# Patient Record
Sex: FEMALE | Race: WHITE | Employment: UNEMPLOYED | URBAN - NONMETROPOLITAN AREA
[De-identification: names, ages, dates, MRNs, and addresses within clinical notes are randomized per-mention and may not be internally consistent; named-entity substitution may affect disease eponyms.]

---

## 2018-06-27 ENCOUNTER — HOSPITAL ENCOUNTER (OUTPATIENT)
Dept: ULTRASOUND IMAGING | Age: 53
Discharge: HOME | End: 2018-06-27
Payer: COMMERCIAL

## 2018-06-27 DIAGNOSIS — E03.9 HYPOTHYROIDISM, UNSPECIFIED TYPE: ICD-10-CM

## 2018-06-27 PROCEDURE — 76536 US EXAM OF HEAD AND NECK: CPT

## 2019-05-16 ENCOUNTER — HOSPITAL ENCOUNTER (OUTPATIENT)
Dept: CT IMAGING | Age: 54
Discharge: HOME | End: 2019-05-16
Payer: COMMERCIAL

## 2019-05-16 DIAGNOSIS — R10.84 DIFFUSE ABDOMINAL PAIN: ICD-10-CM

## 2019-05-16 PROCEDURE — 74177 CT ABD & PELVIS W/CONTRAST: CPT

## 2019-05-16 PROCEDURE — 6360000004 HC RX CONTRAST MEDICATION: Performed by: NURSE PRACTITIONER

## 2019-05-16 RX ADMIN — IOHEXOL 50 ML: 240 INJECTION, SOLUTION INTRATHECAL; INTRAVASCULAR; INTRAVENOUS; ORAL at 11:07

## 2019-05-16 RX ADMIN — IOPAMIDOL 100 ML: 755 INJECTION, SOLUTION INTRAVENOUS at 11:07

## 2019-08-29 ENCOUNTER — NURSE ONLY (OUTPATIENT)
Dept: LAB | Age: 54
End: 2019-08-29

## 2021-02-08 ENCOUNTER — HOSPITAL ENCOUNTER (OUTPATIENT)
Dept: ULTRASOUND IMAGING | Age: 56
Discharge: HOME | End: 2021-02-08
Payer: COMMERCIAL

## 2021-02-08 DIAGNOSIS — E04.9 ENLARGEMENT OF THYROID: ICD-10-CM

## 2021-02-08 PROCEDURE — 76536 US EXAM OF HEAD AND NECK: CPT

## 2021-08-13 ENCOUNTER — HOSPITAL ENCOUNTER (OUTPATIENT)
Dept: ULTRASOUND IMAGING | Age: 56
Discharge: HOME | End: 2021-08-13
Payer: COMMERCIAL

## 2021-08-13 ENCOUNTER — HOSPITAL ENCOUNTER (OUTPATIENT)
Age: 56
End: 2021-08-13

## 2021-08-13 DIAGNOSIS — E04.1 THYROID NODULE: ICD-10-CM

## 2021-08-13 PROCEDURE — 76536 US EXAM OF HEAD AND NECK: CPT

## 2022-02-28 ENCOUNTER — NURSE ONLY (OUTPATIENT)
Dept: LAB | Age: 57
End: 2022-02-28

## 2022-03-07 LAB — CYTOLOGY THIN PREP PAP: NORMAL

## 2022-05-11 LAB — MAMMOGRAPHY, EXTERNAL: NORMAL

## 2022-09-13 LAB
ALBUMIN SERPL-MCNC: 4.4 G/DL
ALP BLD-CCNC: 43 U/L
ALT SERPL-CCNC: 18 U/L
ANION GAP SERPL CALCULATED.3IONS-SCNC: 10 MMOL/L
AST SERPL-CCNC: 17 U/L
AVERAGE GLUCOSE: 103
BILIRUB SERPL-MCNC: 0.5 MG/DL (ref 0.1–1.4)
BUN BLDV-MCNC: 17 MG/DL
CALCIUM SERPL-MCNC: 9.4 MG/DL
CHLORIDE BLD-SCNC: 106 MMOL/L
CHOLESTEROL, TOTAL: 168 MG/DL
CHOLESTEROL/HDL RATIO: 4
CO2: 28 MMOL/L
CREAT SERPL-MCNC: 1 MG/DL
GFR CALCULATED: >60
GLUCOSE BLD-MCNC: 94 MG/DL
HBA1C MFR BLD: 5.2 %
HDLC SERPL-MCNC: 40 MG/DL (ref 35–70)
LDL CHOLESTEROL CALCULATED: 102 MG/DL (ref 0–160)
NONHDLC SERPL-MCNC: NORMAL MG/DL
POTASSIUM SERPL-SCNC: 4.1 MMOL/L
SODIUM BLD-SCNC: 140 MMOL/L
TOTAL PROTEIN: 7
TRIGL SERPL-MCNC: 130 MG/DL
VLDLC SERPL CALC-MCNC: 26 MG/DL

## 2022-09-19 ENCOUNTER — TRANSCRIBE ORDERS (OUTPATIENT)
Dept: ADMINISTRATIVE | Age: 57
End: 2022-09-19

## 2022-09-19 DIAGNOSIS — E04.1 THYROID NODULE: Primary | ICD-10-CM

## 2022-10-12 ENCOUNTER — HOSPITAL ENCOUNTER (OUTPATIENT)
Dept: ULTRASOUND IMAGING | Age: 57
Discharge: HOME | End: 2022-10-12
Payer: COMMERCIAL

## 2022-10-12 DIAGNOSIS — E04.1 THYROID NODULE: ICD-10-CM

## 2022-10-12 PROCEDURE — 76536 US EXAM OF HEAD AND NECK: CPT

## 2022-11-30 SDOH — HEALTH STABILITY: PHYSICAL HEALTH: ON AVERAGE, HOW MANY DAYS PER WEEK DO YOU ENGAGE IN MODERATE TO STRENUOUS EXERCISE (LIKE A BRISK WALK)?: 3 DAYS

## 2022-11-30 SDOH — HEALTH STABILITY: PHYSICAL HEALTH: ON AVERAGE, HOW MANY MINUTES DO YOU ENGAGE IN EXERCISE AT THIS LEVEL?: 30 MIN

## 2022-12-01 ENCOUNTER — OFFICE VISIT (OUTPATIENT)
Dept: FAMILY MEDICINE CLINIC | Age: 57
End: 2022-12-01
Payer: COMMERCIAL

## 2022-12-01 VITALS
TEMPERATURE: 97.4 F | DIASTOLIC BLOOD PRESSURE: 70 MMHG | WEIGHT: 197.8 LBS | SYSTOLIC BLOOD PRESSURE: 126 MMHG | HEART RATE: 86 BPM | RESPIRATION RATE: 16 BRPM | BODY MASS INDEX: 29.3 KG/M2 | HEIGHT: 69 IN | OXYGEN SATURATION: 97 %

## 2022-12-01 DIAGNOSIS — R13.10 DYSPHAGIA, UNSPECIFIED TYPE: ICD-10-CM

## 2022-12-01 DIAGNOSIS — E78.2 MIXED HYPERLIPIDEMIA: Primary | ICD-10-CM

## 2022-12-01 DIAGNOSIS — Z99.89 OBSTRUCTIVE SLEEP APNEA ON CPAP: ICD-10-CM

## 2022-12-01 DIAGNOSIS — R06.02 SHORTNESS OF BREATH: ICD-10-CM

## 2022-12-01 DIAGNOSIS — E04.9 GOITER: ICD-10-CM

## 2022-12-01 DIAGNOSIS — G47.33 OBSTRUCTIVE SLEEP APNEA ON CPAP: ICD-10-CM

## 2022-12-01 PROCEDURE — G8419 CALC BMI OUT NRM PARAM NOF/U: HCPCS | Performed by: FAMILY MEDICINE

## 2022-12-01 PROCEDURE — 1036F TOBACCO NON-USER: CPT | Performed by: FAMILY MEDICINE

## 2022-12-01 PROCEDURE — G8482 FLU IMMUNIZE ORDER/ADMIN: HCPCS | Performed by: FAMILY MEDICINE

## 2022-12-01 PROCEDURE — G8427 DOCREV CUR MEDS BY ELIG CLIN: HCPCS | Performed by: FAMILY MEDICINE

## 2022-12-01 PROCEDURE — 3017F COLORECTAL CA SCREEN DOC REV: CPT | Performed by: FAMILY MEDICINE

## 2022-12-01 PROCEDURE — 99204 OFFICE O/P NEW MOD 45 MIN: CPT | Performed by: FAMILY MEDICINE

## 2022-12-01 RX ORDER — AMITRIPTYLINE HYDROCHLORIDE 10 MG/1
TABLET, FILM COATED ORAL
COMMUNITY
Start: 2022-11-22

## 2022-12-01 RX ORDER — VALACYCLOVIR HYDROCHLORIDE 1 G/1
TABLET, FILM COATED ORAL
COMMUNITY
Start: 2022-10-18

## 2022-12-01 RX ORDER — HYDROCHLOROTHIAZIDE 12.5 MG/1
TABLET ORAL PRN
COMMUNITY
Start: 2022-10-18

## 2022-12-01 RX ORDER — BUPROPION HYDROCHLORIDE 300 MG/1
TABLET ORAL
COMMUNITY
Start: 2022-10-18

## 2022-12-01 SDOH — ECONOMIC STABILITY: FOOD INSECURITY: WITHIN THE PAST 12 MONTHS, YOU WORRIED THAT YOUR FOOD WOULD RUN OUT BEFORE YOU GOT MONEY TO BUY MORE.: NEVER TRUE

## 2022-12-01 SDOH — ECONOMIC STABILITY: FOOD INSECURITY: WITHIN THE PAST 12 MONTHS, THE FOOD YOU BOUGHT JUST DIDN'T LAST AND YOU DIDN'T HAVE MONEY TO GET MORE.: NEVER TRUE

## 2022-12-01 ASSESSMENT — ENCOUNTER SYMPTOMS
CONSTIPATION: 0
DIARRHEA: 0
SORE THROAT: 0
ABDOMINAL PAIN: 0
SHORTNESS OF BREATH: 1
NAUSEA: 0
COUGH: 0
CHOKING: 1
WHEEZING: 0
RHINORRHEA: 0
CHEST TIGHTNESS: 0
TROUBLE SWALLOWING: 1

## 2022-12-01 ASSESSMENT — PATIENT HEALTH QUESTIONNAIRE - PHQ9
SUM OF ALL RESPONSES TO PHQ QUESTIONS 1-9: 0
2. FEELING DOWN, DEPRESSED OR HOPELESS: 0
SUM OF ALL RESPONSES TO PHQ QUESTIONS 1-9: 0
1. LITTLE INTEREST OR PLEASURE IN DOING THINGS: 0
SUM OF ALL RESPONSES TO PHQ QUESTIONS 1-9: 0
SUM OF ALL RESPONSES TO PHQ9 QUESTIONS 1 & 2: 0
SUM OF ALL RESPONSES TO PHQ QUESTIONS 1-9: 0

## 2022-12-01 ASSESSMENT — SOCIAL DETERMINANTS OF HEALTH (SDOH): HOW HARD IS IT FOR YOU TO PAY FOR THE VERY BASICS LIKE FOOD, HOUSING, MEDICAL CARE, AND HEATING?: NOT HARD AT ALL

## 2022-12-01 NOTE — PROGRESS NOTES
[address] [place] [address]  [address] [place] [address]  Dept: [address]: [address] (:  1965) is a 61 y.o. female, here for evaluation of the following chief complaint(s):  New Patient      ASSESSMENT/PLAN:  1. Mixed hyperlipidemia  2. Shortness of breath  3. Obstructive sleep apnea on CPAP  4. Goiter  5. Dysphagia, unspecified type    Labs reviewed, controlled with lipitor, cont  Discussed work up and reviewed PFTs, echo, stress test. Negative work up. Reassurance given. Work on increasing stamina/endurance  Cont cpap  Cont elavil and wellbutrin for mood. Controlled  Monitor thyroid US and labs. Consider referral to ENT if choking/swallowing persists. Pt to reach out to GI for possible EGD/dilatation  No follow-ups on file. 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 She has history of hyperlipidemia, sleep apnea, grief, shortness of breath. She states her cholesterol has been controlled with her statin. She takes 10 mg of Lipitor and tolerates this well. She did try to go off of it then mentions that her lipids went back up so she has restarted it. She did bring with her labs which showed total cholesterol of 168. Next, patient has history of sleep apnea and she does wear CPAP. She complains of shortness of breath that has been going on for approximately 1 year. Her previous provider did a work-up including PFTs, echocardiogram, stress test.  These were all reviewed with the patient and they were all within normal limits. Her ejection fraction on echo was between 60 and 65% and on her stress test was greater than 70%. She denies any lower extremity swelling. She does state that it seems to be a little improved lately.   She remembers that she had symptoms of a viral infection prior to her shortness of breath, but states she tested negative for COVID. Finally, patient has history of goiter and has thyroid ultrasounds done every 6 to 12 months. There are nodules that she has been monitored for. She does complain of difficulty swallowing and wonders if her goiter is something to do with this. Also mentions that her sister has similar issues with swallowing and does have to have an EGD for relation regularly. Patient does have a GI physician and will reach out to them with this regard. Review of Systems   Constitutional:  Positive for activity change and unexpected weight change. Negative for appetite change, chills, fatigue and fever. HENT:  Positive for trouble swallowing. Negative for congestion, rhinorrhea and sore throat. Respiratory:  Positive for choking and shortness of breath. Negative for cough, chest tightness and wheezing. Cardiovascular:  Negative for chest pain and palpitations. Gastrointestinal:  Negative for abdominal pain, constipation, diarrhea and nausea. Genitourinary:  Negative for dysuria and hematuria. Musculoskeletal:  Negative for arthralgias and myalgias. Neurological:  Negative for dizziness and headaches. Psychiatric/Behavioral:  Negative for dysphoric mood (controlled, but some grief at loss of sister 1 year ago) and sleep disturbance. The patient is not nervous/anxious. Physical Exam  Vitals and nursing note reviewed. Constitutional:       General: She is not in acute distress. Appearance: Normal appearance. She is well-developed. HENT:      Head: Normocephalic and atraumatic. Right Ear: Hearing, tympanic membrane, ear canal and external ear normal.      Left Ear: Hearing, tympanic membrane, ear canal and external ear normal.      Nose: No congestion or rhinorrhea. Right Sinus: No maxillary sinus tenderness or frontal sinus tenderness. Left Sinus: No maxillary sinus tenderness or frontal sinus tenderness. Mouth/Throat:      Pharynx: No oropharyngeal exudate or posterior oropharyngeal erythema. Eyes:      General: No scleral icterus. Right eye: No discharge. Left eye: No discharge. Conjunctiva/sclera: Conjunctivae normal.      Pupils: Pupils are equal, round, and reactive to light. Neck:      Thyroid: Thyromegaly present. No thyroid tenderness. Cardiovascular:      Rate and Rhythm: Normal rate and regular rhythm. Heart sounds: Normal heart sounds. Pulmonary:      Effort: Pulmonary effort is normal. No respiratory distress. Breath sounds: Normal breath sounds. No wheezing. Abdominal:      General: Bowel sounds are normal. There is no distension. Palpations: Abdomen is soft. Tenderness: There is no abdominal tenderness. Musculoskeletal:         General: No tenderness. Normal range of motion. Cervical back: Normal range of motion and neck supple. Lymphadenopathy:      Cervical: No cervical adenopathy. Right cervical: No superficial cervical adenopathy. Left cervical: No superficial cervical adenopathy. Skin:     General: Skin is warm and dry. Findings: No rash. Neurological:      Mental Status: She is alert and oriented to person, place, and time. Motor: No abnormal muscle tone. Coordination: Coordination normal.   Psychiatric:         Mood and Affect: Mood and affect normal.         Behavior: Behavior normal.         Thought Content: Thought content normal.         Judgment: Judgment normal.       Vitals:    12/01/22 0825   BP: 126/70   Pulse: 86   Resp: 16   Temp: 97.4 °F (36.3 °C)   SpO2: 97%              An electronic signature was used to authenticate this note.     --Gilson Samano MD

## 2022-12-28 ENCOUNTER — HOSPITAL ENCOUNTER (OUTPATIENT)
Dept: ULTRASOUND IMAGING | Age: 57
Discharge: HOME | End: 2022-12-28
Payer: COMMERCIAL

## 2022-12-28 DIAGNOSIS — K82.4 CHOLESTEROLOSIS OF GALLBLADDER: ICD-10-CM

## 2022-12-28 PROCEDURE — 76705 ECHO EXAM OF ABDOMEN: CPT

## 2023-01-06 ENCOUNTER — PATIENT MESSAGE (OUTPATIENT)
Dept: FAMILY MEDICINE CLINIC | Age: 58
End: 2023-01-06

## 2023-01-06 NOTE — TELEPHONE ENCOUNTER
From: Leann Molina  To: Dr. Hill Pointer: 1/6/2023 2:43 PM EST  Subject: Enrique Armando! Hope your [place] were wonderful! During the holidays I spoke to a few of my relatives that are in the medical field. They suggested that I get a swallow study done before my endoscopy. I was wondering what you thought of that? My endoscopy is scheduled for end of January. My gastroenterologist decided I did not need a colonoscopy for another three years. She also ordered a gallbladder ultrasound due to a previous polyp back in 2008. I had the ultrasound done but I have not heard the results yet. Let me know if you think a swallow study would be effective for me. Thank you!

## 2023-01-13 ENCOUNTER — TELEPHONE (OUTPATIENT)
Dept: FAMILY MEDICINE CLINIC | Age: 58
End: 2023-01-13

## 2023-01-13 NOTE — TELEPHONE ENCOUNTER
Patient called and stated she tested positive for COVID on 1/11/2023. She was calling with some recommendations. Offered her an appointment if she wanted paxlovid. She is not wanting that. Recommended vitamin regiman per Dr. Kori Pandey protocol. She has tried Mucinex-D and Nyquil as well and not helping much. I informed her to give it a couple of weeks and if still having the congestion with green mucous to give us a call. She verbalized understanding.

## 2023-01-16 ENCOUNTER — PATIENT MESSAGE (OUTPATIENT)
Dept: FAMILY MEDICINE CLINIC | Age: 58
End: 2023-01-16

## 2023-01-16 ENCOUNTER — TELEPHONE (OUTPATIENT)
Dept: FAMILY MEDICINE CLINIC | Age: 58
End: 2023-01-16

## 2023-01-16 DIAGNOSIS — J01.90 ACUTE SINUSITIS, RECURRENCE NOT SPECIFIED, UNSPECIFIED LOCATION: Primary | ICD-10-CM

## 2023-01-16 RX ORDER — AMOXICILLIN 500 MG/1
500 CAPSULE ORAL 2 TIMES DAILY
Qty: 14 CAPSULE | Refills: 0 | Status: SHIPPED | OUTPATIENT
Start: 2023-01-16 | End: 2023-01-23

## 2023-01-16 RX ORDER — AZITHROMYCIN 250 MG/1
250 TABLET, FILM COATED ORAL SEE ADMIN INSTRUCTIONS
Qty: 6 TABLET | Refills: 0 | Status: SHIPPED | OUTPATIENT
Start: 2023-01-16 | End: 2023-01-21

## 2023-01-16 NOTE — TELEPHONE ENCOUNTER
Patient was instructed to not take it by her old physicians. Allergies updated. Notified of new med.

## 2023-01-16 NOTE — TELEPHONE ENCOUNTER
Patient calling in she was given zithromax and she is allergic to it, wanting to see if [facility] [place].

## 2023-01-16 NOTE — TELEPHONE ENCOUNTER
From: Payton Clark  To: Dr. Boris Cha: 1/16/2023 11:18 AM EST  Subject: Covid     Im inquiring about getting on an antibiotic for a possible sinus infection or ear infection. I have had Covid symptoms for 8 days and tested positive last Wednesday. I am miserable. I have very thick green mucus that I am coughing up and getting stuck in my throat. My right ear feels full and the right side of my face is plugged despite many efforts with using massage, a facial steamer, sinus rinse, normal saline nasal spray, hot baths, drinking hot liquids, and drinking lots of water. I have taken Mucinex D for 7 days, along with Coricidin, NyQuil, and DayQuil. I do not have any chest congestion and no fever anymore. I do have deep harsh coughing spells now that are sometimes productive and sometimes not. In my past history, I had several sinus infections. I have a deviated nasal septum and have always had difficulty getting my right side of my face to drain, and having to utilize an antibiotic for it to subside. Any other suggestions?

## 2023-05-01 ENCOUNTER — TELEPHONE (OUTPATIENT)
Dept: FAMILY MEDICINE CLINIC | Age: 58
End: 2023-05-01

## 2023-05-01 SDOH — ECONOMIC STABILITY: INCOME INSECURITY: HOW HARD IS IT FOR YOU TO PAY FOR THE VERY BASICS LIKE FOOD, HOUSING, MEDICAL CARE, AND HEATING?: NOT VERY HARD

## 2023-05-01 SDOH — ECONOMIC STABILITY: HOUSING INSECURITY
IN THE LAST 12 MONTHS, WAS THERE A TIME WHEN YOU DID NOT HAVE A STEADY PLACE TO SLEEP OR SLEPT IN A SHELTER (INCLUDING NOW)?: NO

## 2023-05-01 SDOH — ECONOMIC STABILITY: TRANSPORTATION INSECURITY
IN THE PAST 12 MONTHS, HAS LACK OF TRANSPORTATION KEPT YOU FROM MEETINGS, WORK, OR FROM GETTING THINGS NEEDED FOR DAILY LIVING?: NO

## 2023-05-01 SDOH — ECONOMIC STABILITY: FOOD INSECURITY: WITHIN THE PAST 12 MONTHS, THE FOOD YOU BOUGHT JUST DIDN'T LAST AND YOU DIDN'T HAVE MONEY TO GET MORE.: NEVER TRUE

## 2023-05-01 SDOH — ECONOMIC STABILITY: FOOD INSECURITY: WITHIN THE PAST 12 MONTHS, YOU WORRIED THAT YOUR FOOD WOULD RUN OUT BEFORE YOU GOT MONEY TO BUY MORE.: NEVER TRUE

## 2023-05-01 NOTE — TELEPHONE ENCOUNTER
Patient calling in she his experianing back pain since late January. She has done Massage therapy, Physical therapy, dry needling, and also went to a chiropractor. She stated she feels like its not getting any better. She was suggested she might need imaging. Patient scheduled to come into the office 5/2 to see Kelley Balbuena

## 2023-05-02 ENCOUNTER — HOSPITAL ENCOUNTER (OUTPATIENT)
Dept: GENERAL RADIOLOGY | Age: 58
Discharge: HOME | End: 2023-05-02
Payer: COMMERCIAL

## 2023-05-02 ENCOUNTER — HOSPITAL ENCOUNTER (OUTPATIENT)
Age: 58
Discharge: HOME | End: 2023-05-02
Payer: COMMERCIAL

## 2023-05-02 ENCOUNTER — OFFICE VISIT (OUTPATIENT)
Dept: FAMILY MEDICINE CLINIC | Age: 58
End: 2023-05-02
Payer: COMMERCIAL

## 2023-05-02 VITALS
RESPIRATION RATE: 20 BRPM | WEIGHT: 199.8 LBS | BODY MASS INDEX: 29.59 KG/M2 | HEART RATE: 74 BPM | DIASTOLIC BLOOD PRESSURE: 72 MMHG | SYSTOLIC BLOOD PRESSURE: 118 MMHG | OXYGEN SATURATION: 98 % | HEIGHT: 69 IN

## 2023-05-02 DIAGNOSIS — M54.50 CHRONIC MIDLINE LOW BACK PAIN WITHOUT SCIATICA: ICD-10-CM

## 2023-05-02 DIAGNOSIS — G89.29 CHRONIC MIDLINE LOW BACK PAIN WITHOUT SCIATICA: Primary | ICD-10-CM

## 2023-05-02 DIAGNOSIS — G89.29 CHRONIC MIDLINE LOW BACK PAIN WITHOUT SCIATICA: ICD-10-CM

## 2023-05-02 DIAGNOSIS — M53.3 SACRAL BACK PAIN: ICD-10-CM

## 2023-05-02 DIAGNOSIS — M54.50 CHRONIC MIDLINE LOW BACK PAIN WITHOUT SCIATICA: Primary | ICD-10-CM

## 2023-05-02 PROCEDURE — 72100 X-RAY EXAM L-S SPINE 2/3 VWS: CPT

## 2023-05-02 PROCEDURE — G8427 DOCREV CUR MEDS BY ELIG CLIN: HCPCS | Performed by: NURSE PRACTITIONER

## 2023-05-02 PROCEDURE — G8419 CALC BMI OUT NRM PARAM NOF/U: HCPCS | Performed by: NURSE PRACTITIONER

## 2023-05-02 PROCEDURE — 3017F COLORECTAL CA SCREEN DOC REV: CPT | Performed by: NURSE PRACTITIONER

## 2023-05-02 PROCEDURE — 72220 X-RAY EXAM SACRUM TAILBONE: CPT

## 2023-05-02 PROCEDURE — 1036F TOBACCO NON-USER: CPT | Performed by: NURSE PRACTITIONER

## 2023-05-02 PROCEDURE — 99213 OFFICE O/P EST LOW 20 MIN: CPT | Performed by: NURSE PRACTITIONER

## 2023-05-02 RX ORDER — PANTOPRAZOLE SODIUM 40 MG/1
40 TABLET, DELAYED RELEASE ORAL DAILY
COMMUNITY

## 2023-05-02 ASSESSMENT — PATIENT HEALTH QUESTIONNAIRE - PHQ9
2. FEELING DOWN, DEPRESSED OR HOPELESS: 0
SUM OF ALL RESPONSES TO PHQ QUESTIONS 1-9: 0
1. LITTLE INTEREST OR PLEASURE IN DOING THINGS: 0
SUM OF ALL RESPONSES TO PHQ QUESTIONS 1-9: 0
SUM OF ALL RESPONSES TO PHQ9 QUESTIONS 1 & 2: 0

## 2023-05-02 NOTE — PROGRESS NOTES
Bimal Mendez (:  1965) is a 61 y.o. female,Established patient, here for evaluation of the following chief complaint(s):  Back Pain (Since  patient has done massage therapy, PT, chiropractor )         ASSESSMENT/PLAN:  1. Chronic midline low back pain without sciatica  -     XR LUMBAR SPINE (2-3 VIEWS); Future  2. Sacral back pain  -     XR SACRUM COCCYX (MIN 2 VIEWS); Future    At this time we will go ahead and order some x-rays. She can continue taking Tylenol or Motrin for discomfort. She is not to do any heavy lifting. We will consider an MRI after x-rays. Return if symptoms worsen or fail to improve. Subjective   SUBJECTIVE/OBJECTIVE:  HPI    Since January had lower back pain. Very painful. Not any better. Seen chiropractor and messaging but no better. Did do some dry needling at physical therapy. Pain is moderate. Feels like alternating constipation and diarrhea since this started. No numbness in legs. Some tingling in left calf. That comes and goes. No prior injury's or surgery's on lower back. Review of Systems   Constitutional:  Negative for activity change, appetite change, chills, diaphoresis, fatigue, fever and unexpected weight change. HENT:  Negative for congestion, ear pain, postnasal drip, sinus pressure and sore throat. Eyes:  Negative for visual disturbance. Respiratory:  Negative for cough, chest tightness, shortness of breath, wheezing and stridor. Cardiovascular:  Negative for chest pain, palpitations and leg swelling. Gastrointestinal:  Negative for abdominal distention, abdominal pain, constipation, diarrhea, nausea and vomiting. Endocrine: Negative for polydipsia, polyphagia and polyuria. Genitourinary:  Negative for decreased urine volume, difficulty urinating, dysuria, flank pain, frequency, hematuria and urgency. Musculoskeletal:  Positive for back pain.  Negative for arthralgias, gait problem, joint

## 2023-05-03 DIAGNOSIS — M54.50 LUMBAR BACK PAIN: Primary | ICD-10-CM

## 2023-05-03 DIAGNOSIS — R93.7 ABNORMAL X-RAY OF LUMBAR SPINE: ICD-10-CM

## 2023-05-06 ASSESSMENT — ENCOUNTER SYMPTOMS
SHORTNESS OF BREATH: 0
SINUS PRESSURE: 0
SORE THROAT: 0
ABDOMINAL DISTENTION: 0
COLOR CHANGE: 0
VOMITING: 0
BACK PAIN: 1
DIARRHEA: 0
CONSTIPATION: 0
NAUSEA: 0
COUGH: 0
ABDOMINAL PAIN: 0
CHEST TIGHTNESS: 0
STRIDOR: 0
WHEEZING: 0

## 2023-05-15 ENCOUNTER — TELEPHONE (OUTPATIENT)
Dept: FAMILY MEDICINE CLINIC | Age: 58
End: 2023-05-15

## 2023-05-15 NOTE — TELEPHONE ENCOUNTER
----- Message from [employer] sent at 5/15/2023  8:37 AM EDT -----  Subject: Message to Provider    QUESTIONS  Information for Provider? Pt calling in regarding her prior auth for her   MRI, they called an said the PA is not gone through and appt for MRI is   5/16 tomorrow. Insurance gave this number for it [phone]  ---------------------------------------------------------------------------  --------------  [phone] [employer]  [phone]; OK to leave message on voicemail  ---------------------------------------------------------------------------  --------------  SCRIPT ANSWERS  Relationship to Patient?  Self

## 2023-05-15 NOTE — TELEPHONE ENCOUNTER
Called our PA department since this has been schedule for over a week and no PA has been started for patient's MRI tomorrow. I spent 10 minutes on hold trying to get in contact with someone at our [address] [place] [address]. 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 Will try again later.

## 2023-05-15 NOTE — TELEPHONE ENCOUNTER
Procedures:     (Canceled) [id number] - MRI SACRUM COCCYX WO CONTRAST     89383 (CPT®) - CHG MRI PELVIS W/O CONTRAST MATERIAL  Insurance:  [facility]  Source: Phone   Source Details: [phone]  Comments :     Ref #:  [id number] per Charisma Issa  Electronically signed by Aretha Putnam MA on 5/15/23 at 1:55 PM       Patient notified that the test has been approved and that we have the reference number on file.

## 2023-05-16 ENCOUNTER — HOSPITAL ENCOUNTER (OUTPATIENT)
Dept: MRI IMAGING | Age: 58
Discharge: HOME | End: 2023-05-16
Payer: COMMERCIAL

## 2023-05-16 DIAGNOSIS — M54.50 LUMBAR BACK PAIN: ICD-10-CM

## 2023-05-16 DIAGNOSIS — R93.7 ABNORMAL X-RAY OF LUMBAR SPINE: ICD-10-CM

## 2023-05-16 PROCEDURE — 72195 MRI PELVIS W/O DYE: CPT

## 2023-05-16 PROCEDURE — 72148 MRI LUMBAR SPINE W/O DYE: CPT

## 2023-05-17 DIAGNOSIS — G89.29 CHRONIC MIDLINE LOW BACK PAIN WITHOUT SCIATICA: Primary | ICD-10-CM

## 2023-05-17 DIAGNOSIS — M54.50 CHRONIC MIDLINE LOW BACK PAIN WITHOUT SCIATICA: Primary | ICD-10-CM

## 2023-06-21 ENCOUNTER — TELEPHONE (OUTPATIENT)
Dept: FAMILY MEDICINE CLINIC | Age: 58
End: 2023-06-21

## 2023-07-12 RX ORDER — BUPROPION HYDROCHLORIDE 300 MG/1
300 TABLET ORAL DAILY
Qty: 30 TABLET | Refills: 3 | Status: SHIPPED | OUTPATIENT
Start: 2023-07-12

## 2023-07-12 RX ORDER — CYCLOBENZAPRINE HCL 10 MG
10 TABLET ORAL 2 TIMES DAILY PRN
Qty: 60 TABLET | Refills: 2 | Status: SHIPPED | OUTPATIENT
Start: 2023-07-12

## 2023-07-25 NOTE — TELEPHONE ENCOUNTER
Checked the status of this today. The prior authorization department advised as follows: The accounts appear to be with the Accounts Receivable department who work the appeals. Authorization Leadership reviewed the account and stated that the Authorization Representative relied on the information provided by [facility].         Do you know if the patient has received a bill from [facility] for the MRIs? If there is no bill and the patient would still like to discuss the status of the account and/or follow up for clarification, please direct her to call Customer Service. They can help answer specific questions the patient may have.  The number is [phone] Monday-Friday 8am-4:30pm.

## 2023-09-20 ENCOUNTER — OFFICE VISIT (OUTPATIENT)
Dept: FAMILY MEDICINE CLINIC | Age: 58
End: 2023-09-20
Payer: COMMERCIAL

## 2023-09-20 VITALS
SYSTOLIC BLOOD PRESSURE: 116 MMHG | WEIGHT: 203 LBS | BODY MASS INDEX: 30.07 KG/M2 | OXYGEN SATURATION: 99 % | HEIGHT: 69 IN | HEART RATE: 77 BPM | DIASTOLIC BLOOD PRESSURE: 70 MMHG | RESPIRATION RATE: 16 BRPM

## 2023-09-20 DIAGNOSIS — G89.29 CHRONIC MIDLINE LOW BACK PAIN WITHOUT SCIATICA: ICD-10-CM

## 2023-09-20 DIAGNOSIS — F33.1 MODERATE EPISODE OF RECURRENT MAJOR DEPRESSIVE DISORDER (HCC): ICD-10-CM

## 2023-09-20 DIAGNOSIS — E04.9 GOITER: ICD-10-CM

## 2023-09-20 DIAGNOSIS — Z00.00 ANNUAL PHYSICAL EXAM: Primary | ICD-10-CM

## 2023-09-20 DIAGNOSIS — R63.5 WEIGHT GAIN: ICD-10-CM

## 2023-09-20 DIAGNOSIS — E78.2 MIXED HYPERLIPIDEMIA: ICD-10-CM

## 2023-09-20 DIAGNOSIS — M54.50 CHRONIC MIDLINE LOW BACK PAIN WITHOUT SCIATICA: ICD-10-CM

## 2023-09-20 DIAGNOSIS — R92.8 ABNORMAL MAMMOGRAM: ICD-10-CM

## 2023-09-20 PROCEDURE — 99396 PREV VISIT EST AGE 40-64: CPT | Performed by: FAMILY MEDICINE

## 2023-09-20 RX ORDER — DOCUSATE SODIUM 100 MG/1
CAPSULE, LIQUID FILLED ORAL
COMMUNITY
Start: 2023-07-10

## 2023-09-20 RX ORDER — BUPROPION HYDROCHLORIDE 150 MG/1
150 TABLET ORAL EVERY MORNING
Qty: 90 TABLET | Refills: 3 | Status: SHIPPED | OUTPATIENT
Start: 2023-09-20

## 2023-09-20 ASSESSMENT — ENCOUNTER SYMPTOMS
BACK PAIN: 1
VOMITING: 0
CONSTIPATION: 0
SHORTNESS OF BREATH: 0
DIARRHEA: 0
ABDOMINAL PAIN: 0
COLOR CHANGE: 0
APNEA: 0
TROUBLE SWALLOWING: 0
NAUSEA: 0
SORE THROAT: 0
WHEEZING: 0
SINUS PRESSURE: 0
COUGH: 0
RHINORRHEA: 0

## 2023-09-20 NOTE — PROGRESS NOTES
2023    Ludy Boudreaux (:  1965) is a 61 y.o. female, here for a preventive medicine evaluation. Patient Active Problem List   Diagnosis    HIGH CHOLESTEROL    GERD (gastroesophageal reflux disease)    Obstructive sleep apnea on CPAP    Goiter    Mixed hyperlipidemia       Review of Systems   Constitutional:  Positive for activity change, fatigue and unexpected weight change. Negative for appetite change, chills and fever. HENT:  Negative for congestion, postnasal drip, rhinorrhea, sinus pressure, sore throat and trouble swallowing (not since EGD with dilation). Respiratory:  Negative for apnea, cough, shortness of breath and wheezing. Cardiovascular:  Negative for chest pain, palpitations and leg swelling. Gastrointestinal:  Negative for abdominal pain, constipation, diarrhea, nausea and vomiting. Genitourinary:  Negative for difficulty urinating, dysuria, frequency and urgency. Musculoskeletal:  Positive for back pain. Negative for myalgias. Skin:  Negative for color change and rash. Neurological:  Negative for dizziness, light-headedness and headaches. Psychiatric/Behavioral:  Negative for decreased concentration, dysphoric mood (fairly well controlled) and sleep disturbance. The patient is not nervous/anxious. Prior to Visit Medications    Medication Sig Taking?  Authorizing Provider   docusate sodium (COLACE) 100 MG capsule  Yes Historical Provider, MD   Wheat Dextrin (BENEFIBER HEALTHY SHAPE PO)  Yes Historical Provider, MD   buPROPion (WELLBUTRIN XL) 150 MG extended release tablet Take 1 tablet by mouth every morning Take with 300mg tablet for total of 450mg PO daily Yes Carina Bhatia MD   cyclobenzaprine (FLEXERIL) 10 MG tablet Take 1 tablet by mouth 2 times daily as needed for Muscle spasms Yes Carina Bhatia MD   buPROPion (WELLBUTRIN XL) 300 MG extended release tablet Take 1 tablet by mouth daily Yes Carina Bhatia MD   pantoprazole (PROTONIX) 40 MG tablet

## 2023-10-03 ENCOUNTER — HOSPITAL ENCOUNTER (OUTPATIENT)
Age: 58
Discharge: HOME | End: 2023-10-03
Payer: COMMERCIAL

## 2023-10-03 DIAGNOSIS — E78.2 MIXED HYPERLIPIDEMIA: ICD-10-CM

## 2023-10-03 DIAGNOSIS — E04.9 GOITER: ICD-10-CM

## 2023-10-03 LAB
BASOPHILS ABSOLUTE: 0 THOU/MM3 (ref 0–0.1)
BASOPHILS NFR BLD AUTO: 0.7 %
DEPRECATED RDW RBC AUTO: 45.6 FL (ref 35–45)
EOSINOPHIL NFR BLD AUTO: 3.9 %
EOSINOPHILS ABSOLUTE: 0.2 THOU/MM3 (ref 0–0.4)
ERYTHROCYTE [DISTWIDTH] IN BLOOD BY AUTOMATED COUNT: 13.2 % (ref 11.5–14.5)
HCT VFR BLD AUTO: 38 % (ref 37–47)
HGB BLD-MCNC: 12.1 GM/DL (ref 12–16)
IMM GRANULOCYTES # BLD AUTO: 0.01 THOU/MM3 (ref 0–0.07)
IMM GRANULOCYTES NFR BLD AUTO: 0.2 %
LYMPHOCYTES ABSOLUTE: 1.2 THOU/MM3 (ref 1–4.8)
LYMPHOCYTES NFR BLD AUTO: 29.2 %
MCH RBC QN AUTO: 30.3 PG (ref 26–33)
MCHC RBC AUTO-ENTMCNC: 31.8 GM/DL (ref 32.2–35.5)
MCV RBC AUTO: 95 FL (ref 81–99)
MONOCYTES ABSOLUTE: 0.3 THOU/MM3 (ref 0.4–1.3)
MONOCYTES NFR BLD AUTO: 8.1 %
NEUTROPHILS NFR BLD AUTO: 57.9 %
NRBC BLD AUTO-RTO: 0 /100 WBC
PLATELET # BLD AUTO: 233 THOU/MM3 (ref 130–400)
PMV BLD AUTO: 10.1 FL (ref 9.4–12.4)
RBC # BLD AUTO: 4 MILL/MM3 (ref 4.2–5.4)
SEGMENTED NEUTROPHILS ABSOLUTE COUNT: 2.4 THOU/MM3 (ref 1.8–7.7)
WBC # BLD AUTO: 4.1 THOU/MM3 (ref 4.8–10.8)

## 2023-10-03 PROCEDURE — 80061 LIPID PANEL: CPT

## 2023-10-03 PROCEDURE — 85025 COMPLETE CBC W/AUTO DIFF WBC: CPT

## 2023-10-03 PROCEDURE — 80053 COMPREHEN METABOLIC PANEL: CPT

## 2023-10-03 PROCEDURE — 36415 COLL VENOUS BLD VENIPUNCTURE: CPT

## 2023-10-03 PROCEDURE — 84443 ASSAY THYROID STIM HORMONE: CPT

## 2023-10-04 LAB
ALBUMIN SERPL BCG-MCNC: 4.5 G/DL (ref 3.5–5.1)
ALP SERPL-CCNC: 60 U/L (ref 38–126)
ALT SERPL W/O P-5'-P-CCNC: 25 U/L (ref 11–66)
ANION GAP SERPL CALC-SCNC: 8 MEQ/L (ref 8–16)
AST SERPL-CCNC: 23 U/L (ref 5–40)
BILIRUB SERPL-MCNC: 0.5 MG/DL (ref 0.3–1.2)
BUN SERPL-MCNC: 11 MG/DL (ref 7–22)
CALCIUM SERPL-MCNC: 9.6 MG/DL (ref 8.5–10.5)
CHLORIDE SERPL-SCNC: 104 MEQ/L (ref 98–111)
CHOLEST SERPL-MCNC: 180 MG/DL (ref 100–199)
CO2 SERPL-SCNC: 29 MEQ/L (ref 23–33)
CREAT SERPL-MCNC: 1.1 MG/DL (ref 0.4–1.2)
GFR SERPL CREATININE-BSD FRML MDRD: 58 ML/MIN/1.73M2
GLUCOSE SERPL-MCNC: 99 MG/DL (ref 70–108)
HDLC SERPL-MCNC: 42 MG/DL
LDLC SERPL CALC-MCNC: 111 MG/DL
POTASSIUM SERPL-SCNC: 4.3 MEQ/L (ref 3.5–5.2)
PROT SERPL-MCNC: 6.9 G/DL (ref 6.1–8)
SODIUM SERPL-SCNC: 141 MEQ/L (ref 135–145)
TRIGL SERPL-MCNC: 134 MG/DL (ref 0–199)
TSH SERPL DL<=0.005 MIU/L-ACNC: 1.44 UIU/ML (ref 0.4–4.2)

## 2023-10-23 ENCOUNTER — HOSPITAL ENCOUNTER (OUTPATIENT)
Dept: ULTRASOUND IMAGING | Age: 58
Discharge: HOME | End: 2023-10-23
Attending: FAMILY MEDICINE
Payer: COMMERCIAL

## 2023-10-23 DIAGNOSIS — E04.9 GOITER: ICD-10-CM

## 2023-10-23 PROCEDURE — 76536 US EXAM OF HEAD AND NECK: CPT

## 2023-11-02 RX ORDER — BUPROPION HYDROCHLORIDE 300 MG/1
300 TABLET ORAL DAILY
Qty: 30 TABLET | Refills: 0 | Status: SHIPPED | OUTPATIENT
Start: 2023-11-02

## 2023-11-27 RX ORDER — BUPROPION HYDROCHLORIDE 300 MG/1
300 TABLET ORAL DAILY
Qty: 30 TABLET | Refills: 0 | Status: SHIPPED | OUTPATIENT
Start: 2023-11-27

## 2023-12-05 ENCOUNTER — PATIENT MESSAGE (OUTPATIENT)
Dept: FAMILY MEDICINE CLINIC | Age: 58
End: 2023-12-05

## 2023-12-05 RX ORDER — AMITRIPTYLINE HYDROCHLORIDE 10 MG/1
20 TABLET, FILM COATED ORAL NIGHTLY
Qty: 180 TABLET | Refills: 3 | Status: SHIPPED | OUTPATIENT
Start: 2023-12-05

## 2023-12-05 RX ORDER — ATORVASTATIN CALCIUM 10 MG/1
10 TABLET, FILM COATED ORAL DAILY
Qty: 90 TABLET | Refills: 3 | Status: SHIPPED | OUTPATIENT
Start: 2023-12-05

## 2023-12-05 RX ORDER — VALACYCLOVIR HYDROCHLORIDE 1 G/1
1000 TABLET, FILM COATED ORAL 2 TIMES DAILY
Qty: 6 TABLET | Refills: 3 | Status: SHIPPED | OUTPATIENT
Start: 2023-12-05 | End: 2023-12-08

## 2023-12-07 ENCOUNTER — HOSPITAL ENCOUNTER (OUTPATIENT)
Dept: ULTRASOUND IMAGING | Age: 58
Discharge: HOME | End: 2023-12-07
Payer: COMMERCIAL

## 2023-12-07 DIAGNOSIS — K82.4 GALLBLADDER POLYP: ICD-10-CM

## 2023-12-07 PROCEDURE — 76705 ECHO EXAM OF ABDOMEN: CPT

## 2023-12-21 ENCOUNTER — OFFICE VISIT (OUTPATIENT)
Dept: FAMILY MEDICINE CLINIC | Age: 58
End: 2023-12-21
Payer: COMMERCIAL

## 2023-12-21 VITALS
WEIGHT: 201 LBS | DIASTOLIC BLOOD PRESSURE: 64 MMHG | RESPIRATION RATE: 16 BRPM | SYSTOLIC BLOOD PRESSURE: 112 MMHG | BODY MASS INDEX: 29.68 KG/M2 | HEART RATE: 80 BPM

## 2023-12-21 DIAGNOSIS — R06.2 WHEEZING: ICD-10-CM

## 2023-12-21 DIAGNOSIS — J40 BRONCHITIS: ICD-10-CM

## 2023-12-21 DIAGNOSIS — J01.00 ACUTE NON-RECURRENT MAXILLARY SINUSITIS: Primary | ICD-10-CM

## 2023-12-21 PROCEDURE — 96372 THER/PROPH/DIAG INJ SC/IM: CPT | Performed by: NURSE PRACTITIONER

## 2023-12-21 PROCEDURE — 3017F COLORECTAL CA SCREEN DOC REV: CPT | Performed by: NURSE PRACTITIONER

## 2023-12-21 PROCEDURE — G8482 FLU IMMUNIZE ORDER/ADMIN: HCPCS | Performed by: NURSE PRACTITIONER

## 2023-12-21 PROCEDURE — 1036F TOBACCO NON-USER: CPT | Performed by: NURSE PRACTITIONER

## 2023-12-21 PROCEDURE — G8427 DOCREV CUR MEDS BY ELIG CLIN: HCPCS | Performed by: NURSE PRACTITIONER

## 2023-12-21 PROCEDURE — 99213 OFFICE O/P EST LOW 20 MIN: CPT | Performed by: NURSE PRACTITIONER

## 2023-12-21 PROCEDURE — G8419 CALC BMI OUT NRM PARAM NOF/U: HCPCS | Performed by: NURSE PRACTITIONER

## 2023-12-21 RX ORDER — TRIAMCINOLONE ACETONIDE 40 MG/ML
60 INJECTION, SUSPENSION INTRA-ARTICULAR; INTRAMUSCULAR ONCE
Status: COMPLETED | OUTPATIENT
Start: 2023-12-21 | End: 2023-12-21

## 2023-12-21 RX ORDER — BENZONATATE 200 MG/1
200 CAPSULE ORAL 3 TIMES DAILY PRN
Qty: 21 CAPSULE | Refills: 0 | Status: SHIPPED | OUTPATIENT
Start: 2023-12-21 | End: 2023-12-28

## 2023-12-21 RX ORDER — DOXYCYCLINE HYCLATE 100 MG
100 TABLET ORAL 2 TIMES DAILY
Qty: 14 TABLET | Refills: 0 | Status: SHIPPED | OUTPATIENT
Start: 2023-12-21 | End: 2023-12-26

## 2023-12-21 RX ORDER — BUPROPION HYDROCHLORIDE 150 MG/1
150 TABLET ORAL EVERY MORNING
COMMUNITY

## 2023-12-21 RX ADMIN — TRIAMCINOLONE ACETONIDE 60 MG: 40 INJECTION, SUSPENSION INTRA-ARTICULAR; INTRAMUSCULAR at 11:23

## 2023-12-21 NOTE — PROGRESS NOTES
Nadja Gotti (:  1965) is a 60 y.o. female,Established patient, here for evaluation of the following chief complaint(s):  Cough (Barky, did start out as nasal congestion been going on for awhile and now lingering and started wheezing and rattling in her chest)         ASSESSMENT/PLAN:  1. Acute non-recurrent maxillary sinusitis  2. Bronchitis  3. Wheezing  -     triamcinolone acetonide (KENALOG-40) injection 60 mg; 60 mg, IntraMUSCular, ONCE, 1 dose, On Thu 23 at 1015  Kenalog injection given  Fluids  Rest  Meds as prescribed    Return if symptoms worsen or fail to improve. Subjective   SUBJECTIVE/OBJECTIVE:  HPI      Cough, wheezing, sinus pressure. Started 2 weeks ago. No sob. No fevers. Otc meds not helping. Mucinex D and zyrtec. No known exposures. Nyquil to help sleep. Lots of coughing spells. Tight cough. Review of Systems   Constitutional:  Negative for chills, fatigue and fever. HENT:  Positive for congestion, postnasal drip, sinus pressure and sore throat. Negative for ear pain and sinus pain. Respiratory:  Positive for cough. Negative for shortness of breath and wheezing. Gastrointestinal:  Negative for abdominal pain, diarrhea, nausea and vomiting. Skin:  Negative for color change and rash. Neurological:  Positive for headaches. Negative for dizziness and light-headedness. Objective   Physical Exam  Constitutional:       Appearance: Normal appearance. She is well-developed. HENT:      Head: Normocephalic. Right Ear: Tympanic membrane is bulging. Tympanic membrane is not erythematous. Left Ear: Tympanic membrane is bulging. Tympanic membrane is not erythematous. Nose: Congestion present. Right Sinus: Maxillary sinus tenderness present. No frontal sinus tenderness. Left Sinus: Maxillary sinus tenderness present. No frontal sinus tenderness.       Mouth/Throat:      Mouth: Mucous membranes are moist.

## 2023-12-21 NOTE — PROGRESS NOTES
After obtaining consent, and per orders of MONTY Watson injection  by Manav Byers MA. Patient instructed to remain in clinic for 20 minutes afterwards, and to report any adverse reaction to me immediately. Administrations This Visit       triamcinolone acetonide (KENALOG-40) injection 60 mg       Admin Date  12/21/2023  11:23 Action  Given Dose  60 mg Route  IntraMUSCular Site  Dorsogluteal Right Administered By  Manav Byers MA    Ordering Provider: CIARRA Sanchez CNP    NDC: 9080-3579-68    Lot#: [id number]    : [email or web address] [place] (PRIMARY CARE)    Patient Supplied?: No                    Patient tolerated well.

## 2023-12-26 ENCOUNTER — HOSPITAL ENCOUNTER (OUTPATIENT)
Dept: GENERAL RADIOLOGY | Age: 58
Discharge: HOME | End: 2023-12-26
Payer: COMMERCIAL

## 2023-12-26 ENCOUNTER — OFFICE VISIT (OUTPATIENT)
Dept: FAMILY MEDICINE CLINIC | Age: 58
End: 2023-12-26
Payer: COMMERCIAL

## 2023-12-26 ENCOUNTER — HOSPITAL ENCOUNTER (OUTPATIENT)
Age: 58
Discharge: HOME | End: 2023-12-26
Payer: COMMERCIAL

## 2023-12-26 VITALS
HEART RATE: 84 BPM | DIASTOLIC BLOOD PRESSURE: 82 MMHG | OXYGEN SATURATION: 96 % | BODY MASS INDEX: 29.68 KG/M2 | SYSTOLIC BLOOD PRESSURE: 118 MMHG | WEIGHT: 201 LBS | RESPIRATION RATE: 18 BRPM

## 2023-12-26 DIAGNOSIS — R06.02 SHORTNESS OF BREATH: ICD-10-CM

## 2023-12-26 DIAGNOSIS — R05.1 ACUTE COUGH: ICD-10-CM

## 2023-12-26 DIAGNOSIS — J40 BRONCHITIS: Primary | ICD-10-CM

## 2023-12-26 PROCEDURE — 1036F TOBACCO NON-USER: CPT | Performed by: NURSE PRACTITIONER

## 2023-12-26 PROCEDURE — G8482 FLU IMMUNIZE ORDER/ADMIN: HCPCS | Performed by: NURSE PRACTITIONER

## 2023-12-26 PROCEDURE — G8419 CALC BMI OUT NRM PARAM NOF/U: HCPCS | Performed by: NURSE PRACTITIONER

## 2023-12-26 PROCEDURE — 99213 OFFICE O/P EST LOW 20 MIN: CPT | Performed by: NURSE PRACTITIONER

## 2023-12-26 PROCEDURE — 71046 X-RAY EXAM CHEST 2 VIEWS: CPT

## 2023-12-26 PROCEDURE — G8427 DOCREV CUR MEDS BY ELIG CLIN: HCPCS | Performed by: NURSE PRACTITIONER

## 2023-12-26 PROCEDURE — 3017F COLORECTAL CA SCREEN DOC REV: CPT | Performed by: NURSE PRACTITIONER

## 2023-12-26 RX ORDER — AMOXICILLIN AND CLAVULANATE POTASSIUM 875; 125 MG/1; MG/1
1 TABLET, FILM COATED ORAL 2 TIMES DAILY
Qty: 14 TABLET | Refills: 0 | Status: SHIPPED | OUTPATIENT
Start: 2023-12-26 | End: 2023-12-26

## 2023-12-26 RX ORDER — AMOXICILLIN AND CLAVULANATE POTASSIUM 875; 125 MG/1; MG/1
1 TABLET, FILM COATED ORAL 2 TIMES DAILY
Qty: 20 TABLET | Refills: 0 | Status: SHIPPED | OUTPATIENT
Start: 2023-12-26 | End: 2024-01-05

## 2023-12-26 RX ORDER — PREDNISONE 20 MG/1
20 TABLET ORAL 2 TIMES DAILY
Qty: 10 TABLET | Refills: 0 | Status: SHIPPED | OUTPATIENT
Start: 2023-12-26 | End: 2023-12-31

## 2023-12-26 NOTE — PROGRESS NOTES
Miles Addison (:  1965) is a 60 y.o. female,Established patient, here for evaluation of the following chief complaint(s):  Cough (Not getting better has had antibiotic and steroid getting worse) and Wheezing         ASSESSMENT/PLAN:  1. Bronchitis  2. Shortness of breath  -     XR CHEST (2 VW); Future  3. Acute cough  -     XR CHEST (2 VW); Future      Suspect possible pneumonia  Failed doxycycline, worsening symptoms after 5 days  Start augmentin  Allergy to avelox and zithromax  Obtain chest xray  Prednisone since wheezing    Return if symptoms worsen or fail to improve. Subjective   SUBJECTIVE/OBJECTIVE:    Little congestion. Lots of chest congestion. Started several weeks ago. Did have kenalog injection and doxy last week. Not any better and actually worse now. Little sob. No fevers. Cough  Associated symptoms include shortness of breath and wheezing. Pertinent negatives include no chest pain, chills, ear pain, fever, headaches, myalgias, postnasal drip, rash or sore throat. Wheezing   Associated symptoms include coughing and shortness of breath. Pertinent negatives include no abdominal pain, chest pain, chills, diarrhea, ear pain, fever, headaches, neck pain, rash, sore throat or vomiting. Review of Systems   Constitutional:  Negative for activity change, appetite change, chills, diaphoresis, fatigue, fever and unexpected weight change. HENT:  Positive for congestion. Negative for ear pain, postnasal drip, sinus pressure and sore throat. Eyes:  Negative for visual disturbance. Respiratory:  Positive for cough, shortness of breath and wheezing. Negative for chest tightness and stridor. Cardiovascular:  Negative for chest pain, palpitations and leg swelling. Gastrointestinal:  Negative for abdominal distention, abdominal pain, constipation, diarrhea, nausea and vomiting. Endocrine: Negative for polydipsia, polyphagia and polyuria.    Genitourinary:

## 2023-12-28 RX ORDER — CYCLOBENZAPRINE HCL 10 MG
TABLET ORAL
Qty: 60 TABLET | Refills: 0 | Status: SHIPPED | OUTPATIENT
Start: 2023-12-28

## 2024-01-05 ENCOUNTER — PATIENT MESSAGE (OUTPATIENT)
Dept: FAMILY MEDICINE CLINIC | Age: 59
End: 2024-01-05

## 2024-01-05 DIAGNOSIS — B37.0 CANDIDIASIS OF MOUTH: Primary | ICD-10-CM

## 2024-01-05 NOTE — TELEPHONE ENCOUNTER
Elza Rosales MA 1/5/2024 12:45 PM EST      ----- Message -----  From: Jody Sorto  Sent: 1/5/2024 12:05 PM EST  To: Jasper Nuñez [facility] Clinical Staff  Subject: Thrush     These pics are after I brushed my tongue and swished with salt water.

## 2024-01-23 ENCOUNTER — NURSE ONLY (OUTPATIENT)
Dept: FAMILY MEDICINE CLINIC | Age: 59
End: 2024-01-23
Payer: COMMERCIAL

## 2024-01-23 ENCOUNTER — TELEPHONE (OUTPATIENT)
Dept: FAMILY MEDICINE CLINIC | Age: 59
End: 2024-01-23

## 2024-01-23 DIAGNOSIS — R30.0 DYSURIA: Primary | ICD-10-CM

## 2024-01-23 LAB
BILIRUBIN, POC: NEGATIVE
BLOOD URINE, POC: NORMAL
CLARITY, POC: NORMAL
COLOR, POC: YELLOW
GLUCOSE URINE, POC: NEGATIVE
KETONES, POC: NEGATIVE
LEUKOCYTE EST, POC: NORMAL
NITRITE, POC: NEGATIVE
PH, POC: 6
PROTEIN, POC: NEGATIVE
SPECIFIC GRAVITY, POC: <=1.005
UROBILINOGEN, POC: 0.2

## 2024-01-23 PROCEDURE — 81003 URINALYSIS AUTO W/O SCOPE: CPT

## 2024-01-23 RX ORDER — NITROFURANTOIN 25; 75 MG/1; MG/1
100 CAPSULE ORAL 2 TIMES DAILY
Qty: 10 CAPSULE | Refills: 0 | Status: SHIPPED | OUTPATIENT
Start: 2024-01-23 | End: 2024-01-25

## 2024-01-23 NOTE — TELEPHONE ENCOUNTER
Patient having UTI symptoms of dysuria and bladder spasms for a couple of days. She is going to drop off a urine today.

## 2024-01-23 NOTE — PROGRESS NOTES
Patient is having frequent urination, dysuria, lower abdominal spasms, blood in urine, and urgency for two days.     Also experiencing a cough with green mucus per patient.

## 2024-01-24 ENCOUNTER — TELEPHONE (OUTPATIENT)
Dept: FAMILY MEDICINE CLINIC | Age: 59
End: 2024-01-24

## 2024-01-24 LAB
BACTERIA UR CULT: ABNORMAL
ORGANISM: ABNORMAL

## 2024-01-24 RX ORDER — POLYMYXIN B SULFATE AND TRIMETHOPRIM 1; 10000 MG/ML; [USP'U]/ML
1 SOLUTION OPHTHALMIC EVERY 6 HOURS
Qty: 10 ML | Refills: 0 | Status: SHIPPED | OUTPATIENT
Start: 2024-01-24 | End: 2024-01-24 | Stop reason: SDUPTHER

## 2024-01-24 RX ORDER — AMOXICILLIN AND CLAVULANATE POTASSIUM 875; 125 MG/1; MG/1
1 TABLET, FILM COATED ORAL 2 TIMES DAILY
Qty: 14 TABLET | Refills: 0 | Status: SHIPPED | OUTPATIENT
Start: 2024-01-24 | End: 2024-01-24 | Stop reason: SDUPTHER

## 2024-01-24 RX ORDER — POLYMYXIN B SULFATE AND TRIMETHOPRIM 1; 10000 MG/ML; [USP'U]/ML
1 SOLUTION OPHTHALMIC EVERY 6 HOURS
Qty: 10 ML | Refills: 0 | Status: SHIPPED | OUTPATIENT
Start: 2024-01-24 | End: 2024-02-03

## 2024-01-24 RX ORDER — BUPROPION HYDROCHLORIDE 300 MG/1
300 TABLET ORAL DAILY
Qty: 30 TABLET | Refills: 0 | Status: SHIPPED | OUTPATIENT
Start: 2024-01-24

## 2024-01-24 RX ORDER — AMOXICILLIN AND CLAVULANATE POTASSIUM 875; 125 MG/1; MG/1
1 TABLET, FILM COATED ORAL 2 TIMES DAILY
Qty: 14 TABLET | Refills: 0 | Status: SHIPPED | OUTPATIENT
Start: 2024-01-24 | End: 2024-01-31

## 2024-01-25 ENCOUNTER — HOSPITAL ENCOUNTER (EMERGENCY)
Age: 59
Discharge: HOME | End: 2024-01-25
Attending: EMERGENCY MEDICINE
Payer: COMMERCIAL

## 2024-01-25 ENCOUNTER — APPOINTMENT (OUTPATIENT)
Dept: CT IMAGING | Age: 59
End: 2024-01-25
Payer: COMMERCIAL

## 2024-01-25 VITALS
RESPIRATION RATE: 16 BRPM | OXYGEN SATURATION: 98 % | TEMPERATURE: 98.3 F | WEIGHT: 195 LBS | HEART RATE: 76 BPM | SYSTOLIC BLOOD PRESSURE: 113 MMHG | HEIGHT: 69 IN | DIASTOLIC BLOOD PRESSURE: 87 MMHG | BODY MASS INDEX: 28.88 KG/M2

## 2024-01-25 DIAGNOSIS — J01.00 ACUTE MAXILLARY SINUSITIS, RECURRENCE NOT SPECIFIED: ICD-10-CM

## 2024-01-25 DIAGNOSIS — R51.9 ACUTE NONINTRACTABLE HEADACHE, UNSPECIFIED HEADACHE TYPE: Primary | ICD-10-CM

## 2024-01-25 LAB
ALBUMIN SERPL BCP-MCNC: 3.8 GM/DL (ref 3.4–5)
ALP SERPL-CCNC: 72 U/L (ref 46–116)
ALT SERPL W P-5'-P-CCNC: 21 U/L (ref 14–63)
ANION GAP SERPL CALC-SCNC: 7 MEQ/L (ref 8–16)
AST SERPL W P-5'-P-CCNC: 16 U/L (ref 15–37)
BASOPHILS # BLD: 0.6 % (ref 0–3)
BASOPHILS ABSOLUTE: 0 THOU/MM3 (ref 0–0.1)
BILIRUB SERPL-MCNC: 0.6 MG/DL (ref 0.2–1)
BUN SERPL-MCNC: 13 MG/DL (ref 7–18)
CALCIUM SERPL-MCNC: 9.2 MG/DL (ref 8.5–10.1)
CHLORIDE SERPL-SCNC: 102 MEQ/L (ref 98–107)
CO2 SERPL-SCNC: 31 MEQ/L (ref 21–32)
CREAT SERPL-MCNC: 1 MG/DL (ref 0.6–1.3)
EOSINOPHILS ABSOLUTE: 0.2 THOU/MM3 (ref 0–0.5)
EOSINOPHILS RELATIVE PERCENT: 2.4 % (ref 0–4)
FLUAV AG SPEC QL: NEGATIVE
FLUBV AG SPEC QL: NEGATIVE
GFR SERPL CREATININE-BSD FRML MDRD: > 60 ML/MIN/1.73M2
GLUCOSE SERPL-MCNC: 98 MG/DL (ref 74–106)
HCT VFR BLD CALC: 35.3 % (ref 37–47)
HEMOGLOBIN: 11.3 GM/DL (ref 12–16)
IMMATURE GRANS (ABS): 0 THOU/MM3 (ref 0–0.07)
IMMATURE GRANULOCYTES: 0 %
LACTATE: 0.73 MMOL/L (ref 0.9–1.7)
LYMPHOCYTES # BLD AUTO: 16.5 % (ref 15–47)
LYMPHOCYTES ABSOLUTE: 1.2 THOU/MM3 (ref 1–4.8)
MCH RBC QN AUTO: 29.6 PG (ref 26–32)
MCHC RBC AUTO-ENTMCNC: 32 GM/DL (ref 31–35)
MCV RBC AUTO: 92.4 FL (ref 81–99)
MONOCYTES: 0.6 THOU/MM3 (ref 0.3–1.3)
MONOCYTES: 7.7 % (ref 0–12)
PDW BLD-RTO: 12.7 % (ref 11.5–14.9)
PLATELET # BLD AUTO: 225 THOU/MM3 (ref 130–400)
PMV BLD AUTO: 9.1 FL (ref 9.4–12.4)
POTASSIUM SERPL-SCNC: 3.7 MEQ/L (ref 3.5–5.1)
PROT SERPL-MCNC: 7.6 GM/DL (ref 6.4–8.2)
RBC # BLD: 3.82 MILL/MM3 (ref 4.1–5.3)
SARS-COV-2 RDRP RESP QL NAA+PROBE: NOT  DETECTED
SEG NEUTROPHILS: 72.4 % (ref 43–75)
SEGMENTED NEUTROPHILS ABSOLUTE COUNT: 5.1 THOU/MM3 (ref 1.8–7.7)
SODIUM SERPL-SCNC: 140 MEQ/L (ref 136–145)
WBC # BLD: 7.1 THOU/MM3 (ref 4.8–10.8)

## 2024-01-25 PROCEDURE — 80053 COMPREHEN METABOLIC PANEL: CPT

## 2024-01-25 PROCEDURE — 83605 ASSAY OF LACTIC ACID: CPT

## 2024-01-25 PROCEDURE — 87635 SARS-COV-2 COVID-19 AMP PRB: CPT

## 2024-01-25 PROCEDURE — 36415 COLL VENOUS BLD VENIPUNCTURE: CPT

## 2024-01-25 PROCEDURE — 87040 BLOOD CULTURE FOR BACTERIA: CPT

## 2024-01-25 PROCEDURE — 99284 EMERGENCY DEPT VISIT MOD MDM: CPT

## 2024-01-25 PROCEDURE — 87804 INFLUENZA ASSAY W/OPTIC: CPT

## 2024-01-25 PROCEDURE — 85025 COMPLETE CBC W/AUTO DIFF WBC: CPT

## 2024-01-25 PROCEDURE — 70450 CT HEAD/BRAIN W/O DYE: CPT

## 2024-01-25 RX ORDER — POLYMYXIN B SULFATE AND TRIMETHOPRIM 1; 10000 MG/ML; [USP'U]/ML
1 SOLUTION OPHTHALMIC EVERY 4 HOURS
COMMUNITY

## 2024-01-25 RX ORDER — RIZATRIPTAN BENZOATE 10 MG/1
10 TABLET ORAL
Qty: 6 TABLET | Refills: 0 | Status: SHIPPED | OUTPATIENT
Start: 2024-01-25 | End: 2024-01-25

## 2024-01-25 RX ORDER — BUTALBITAL/ACETAMINOPHEN 50MG-325MG
1 TABLET ORAL EVERY 4 HOURS PRN
Qty: 12 TABLET | Refills: 0 | Status: SHIPPED | OUTPATIENT
Start: 2024-01-25

## 2024-01-25 ASSESSMENT — PAIN DESCRIPTION - ORIENTATION
ORIENTATION: RIGHT
ORIENTATION: RIGHT

## 2024-01-25 ASSESSMENT — PAIN DESCRIPTION - LOCATION
LOCATION: HEAD
LOCATION: HEAD

## 2024-01-25 ASSESSMENT — PAIN DESCRIPTION - DESCRIPTORS
DESCRIPTORS: ACHING
DESCRIPTORS: ACHING

## 2024-01-25 ASSESSMENT — PAIN - FUNCTIONAL ASSESSMENT
PAIN_FUNCTIONAL_ASSESSMENT: 0-10
PAIN_FUNCTIONAL_ASSESSMENT: 0-10

## 2024-01-25 ASSESSMENT — PAIN SCALES - GENERAL
PAINLEVEL_OUTOF10: 7
PAINLEVEL_OUTOF10: 7

## 2024-01-25 ASSESSMENT — PAIN DESCRIPTION - PAIN TYPE
TYPE: ACUTE PAIN
TYPE: ACUTE PAIN

## 2024-01-25 NOTE — TELEPHONE ENCOUNTER
Pt called stating she has eye drainage and is still coughing and blowing out green/yellow mucus.   She also recently had a uti and given macrobid.  She seen the culture and is questioning if the antibiotic will cover the bacteria.  Culture reviewed and showed poor sensitivity.   Sent in augmentin and eye drops for pt.    Instructed to push fluids.   Wash hands frequently.

## 2024-01-25 NOTE — DISCHARGE INSTRUCTIONS
Continue the antibiotic you have started.  Continue the eyedrops that your doctor prescribed for you.  Consider using nasal saline (over-the-counter) as needed for nasal congestion and to help clear sinus drainage.  Over-the-counter pain medication as needed.  Rest, drink plenty of fluids.  Return to the emergency department for fever or any worsening symptoms.

## 2024-01-25 NOTE — ED PROVIDER NOTES
[facility]  eMERGENCY dEPARTMENT eNCOUnter             [facility]    CHIEF COMPLAINT    Chief Complaint   Patient presents with    Headache     Right sided HA, \"My right cheek & right side of lips feels fat.\" Pain mostly behind right eye. (Being treated for conjunctivitis, UTI & respiratory infection also).        Nurses Notes reviewed and I agree except as noted in the HPI.    HPI    Jody Sorto is a 58 y.o. female who presents with a 2-day history of nasal congestion with green postnasal drip, right frontal headache, right maxillary pain, and a swollen sensation in her lips.  Her doctor started her on Augmentin yesterday, she has had 2 doses.  She is also on Polytrim eyedrops for conjunctivitis.  She states her eyes have been red and have had some purulent drainage from them since yesterday.  Prior to that, she was on Macrobid for UTI.  Her urine culture, obtained 1/23/2023 is growing Klebsiella pneumonia.  She has cough, not much chest congestion.    REVIEW OF SYSTEMS      Review of Systems   Constitutional:  Positive for malaise/fatigue. Negative for fever.   HENT:  Positive for congestion. Negative for ear pain.    Eyes:  Positive for discharge and redness.   Respiratory:  Positive for cough. Negative for shortness of breath and wheezing.    Cardiovascular:  Negative for chest pain and palpitations.   Gastrointestinal:  Negative for diarrhea and vomiting.   Musculoskeletal:  Positive for myalgias.   Neurological:  Positive for headaches (right sided). Negative for focal weakness.   All other systems reviewed and are negative.        PAST MEDICAL HISTORY     has a past medical history of Anticardiolipin antibody positive, Blood clotting tendency (HCC), Depression, Hyperlipidemia, PAC (premature atrial contraction), PVC (premature ventricular contraction), and Unspecified sleep apnea.    SURGICAL HISTORY     has a past surgical history that includes Umbilical

## 2024-01-25 NOTE — ED NOTES
Pt pink, warm and dry, breathing with ease. Tylenol and or motrin encouraged. Prescriptions explained, pt states understanding. AVS reviewed. Denies questions or concerns. Pt remains alert and oriented. Pt discharged in stable condition.

## 2024-01-25 NOTE — ED TRIAGE NOTES
Pt with right sided HA, right cheek and lips feel swollen. Pt is taking augmentin for UTI and respiratory issues, and eye drops for conjunctivitis.

## 2024-01-26 ASSESSMENT — ENCOUNTER SYMPTOMS
DIARRHEA: 0
COUGH: 1
SHORTNESS OF BREATH: 0
EYE REDNESS: 1
EYE DISCHARGE: 1
WHEEZING: 0
VOMITING: 0

## 2024-01-27 LAB
BACTERIA BLD AEROBE CULT: NORMAL
BACTERIA BLD AEROBE CULT: NORMAL

## 2024-01-31 LAB
BACTERIA BLD AEROBE CULT: NORMAL
BACTERIA BLD AEROBE CULT: NORMAL

## 2024-03-03 ASSESSMENT — PATIENT HEALTH QUESTIONNAIRE - PHQ9
SUM OF ALL RESPONSES TO PHQ QUESTIONS 1-9: 4
6. FEELING BAD ABOUT YOURSELF - OR THAT YOU ARE A FAILURE OR HAVE LET YOURSELF OR YOUR FAMILY DOWN: NOT AT ALL
5. POOR APPETITE OR OVEREATING: 0
10. IF YOU CHECKED OFF ANY PROBLEMS, HOW DIFFICULT HAVE THESE PROBLEMS MADE IT FOR YOU TO DO YOUR WORK, TAKE CARE OF THINGS AT HOME, OR GET ALONG WITH OTHER PEOPLE: NOT DIFFICULT AT ALL
SUM OF ALL RESPONSES TO PHQ9 QUESTIONS 1 & 2: 1
4. FEELING TIRED OR HAVING LITTLE ENERGY: 1
9. THOUGHTS THAT YOU WOULD BE BETTER OFF DEAD, OR OF HURTING YOURSELF: NOT AT ALL
8. MOVING OR SPEAKING SO SLOWLY THAT OTHER PEOPLE COULD HAVE NOTICED. OR THE OPPOSITE, BEING SO FIGETY OR RESTLESS THAT YOU HAVE BEEN MOVING AROUND A LOT MORE THAN USUAL: 0
9. THOUGHTS THAT YOU WOULD BE BETTER OFF DEAD, OR OF HURTING YOURSELF: 0
SUM OF ALL RESPONSES TO PHQ QUESTIONS 1-9: 4
7. TROUBLE CONCENTRATING ON THINGS, SUCH AS READING THE NEWSPAPER OR WATCHING TELEVISION: 1
2. FEELING DOWN, DEPRESSED OR HOPELESS: SEVERAL DAYS
4. FEELING TIRED OR HAVING LITTLE ENERGY: SEVERAL DAYS
1. LITTLE INTEREST OR PLEASURE IN DOING THINGS: NOT AT ALL
3. TROUBLE FALLING OR STAYING ASLEEP: SEVERAL DAYS
7. TROUBLE CONCENTRATING ON THINGS, SUCH AS READING THE NEWSPAPER OR WATCHING TELEVISION: SEVERAL DAYS
10. IF YOU CHECKED OFF ANY PROBLEMS, HOW DIFFICULT HAVE THESE PROBLEMS MADE IT FOR YOU TO DO YOUR WORK, TAKE CARE OF THINGS AT HOME, OR GET ALONG WITH OTHER PEOPLE: 0
2. FEELING DOWN, DEPRESSED OR HOPELESS: 1
5. POOR APPETITE OR OVEREATING: NOT AT ALL
6. FEELING BAD ABOUT YOURSELF - OR THAT YOU ARE A FAILURE OR HAVE LET YOURSELF OR YOUR FAMILY DOWN: 0
8. MOVING OR SPEAKING SO SLOWLY THAT OTHER PEOPLE COULD HAVE NOTICED. OR THE OPPOSITE - BEING SO FIDGETY OR RESTLESS THAT YOU HAVE BEEN MOVING AROUND A LOT MORE THAN USUAL: NOT AT ALL
SUM OF ALL RESPONSES TO PHQ QUESTIONS 1-9: 4
1. LITTLE INTEREST OR PLEASURE IN DOING THINGS: 0
3. TROUBLE FALLING OR STAYING ASLEEP: 1

## 2024-03-04 ENCOUNTER — OFFICE VISIT (OUTPATIENT)
Dept: FAMILY MEDICINE CLINIC | Age: 59
End: 2024-03-04
Payer: COMMERCIAL

## 2024-03-04 VITALS
HEART RATE: 68 BPM | RESPIRATION RATE: 16 BRPM | WEIGHT: 203 LBS | OXYGEN SATURATION: 99 % | SYSTOLIC BLOOD PRESSURE: 130 MMHG | DIASTOLIC BLOOD PRESSURE: 74 MMHG | BODY MASS INDEX: 29.98 KG/M2

## 2024-03-04 DIAGNOSIS — K14.6 BURNING TONGUE SYNDROME: Primary | ICD-10-CM

## 2024-03-04 LAB
BASOPHILS NFR BLD AUTO: 0.7 %
DEPRECATED RDW RBC AUTO: 43.9 FL (ref 35–45)
EOSINOPHILS ABSOLUTE: 0.1 THOU/MM3 (ref 0–0.4)
ERYTHROCYTE [DISTWIDTH] IN BLOOD BY AUTOMATED COUNT: 12.8 % (ref 11.5–14.5)
HCT VFR BLD AUTO: 37.6 % (ref 37–47)
IMM GRANULOCYTES # BLD AUTO: 0.01 THOU/MM3 (ref 0–0.07)
LYMPHOCYTES ABSOLUTE: 1 THOU/MM3 (ref 1–4.8)
LYMPHOCYTES NFR BLD AUTO: 24.3 %
MCH RBC QN AUTO: 29.6 PG (ref 26–33)
MCV RBC AUTO: 93.5 FL (ref 81–99)
NEUTROPHILS NFR BLD AUTO: 63.8 %
PLATELET # BLD AUTO: 234 THOU/MM3 (ref 130–400)
PMV BLD AUTO: 10 FL (ref 9.4–12.4)
SEGMENTED NEUTROPHILS ABSOLUTE COUNT: 2.6 THOU/MM3 (ref 1.8–7.7)
VIT B12 SERPL-MCNC: 576 PG/ML (ref 211–911)
WBC # BLD AUTO: 4.1 THOU/MM3 (ref 4.8–10.8)

## 2024-03-04 PROCEDURE — 1036F TOBACCO NON-USER: CPT | Performed by: FAMILY MEDICINE

## 2024-03-04 PROCEDURE — G8419 CALC BMI OUT NRM PARAM NOF/U: HCPCS | Performed by: FAMILY MEDICINE

## 2024-03-04 PROCEDURE — 99214 OFFICE O/P EST MOD 30 MIN: CPT | Performed by: FAMILY MEDICINE

## 2024-03-04 PROCEDURE — G8482 FLU IMMUNIZE ORDER/ADMIN: HCPCS | Performed by: FAMILY MEDICINE

## 2024-03-04 PROCEDURE — 3017F COLORECTAL CA SCREEN DOC REV: CPT | Performed by: FAMILY MEDICINE

## 2024-03-04 PROCEDURE — G8427 DOCREV CUR MEDS BY ELIG CLIN: HCPCS | Performed by: FAMILY MEDICINE

## 2024-03-04 RX ORDER — LIDOCAINE HYDROCHLORIDE 20 MG/ML
15 SOLUTION OROPHARYNGEAL
Qty: 100 ML | Refills: 3 | Status: SHIPPED | OUTPATIENT
Start: 2024-03-04

## 2024-03-04 ASSESSMENT — ENCOUNTER SYMPTOMS
DIARRHEA: 0
ABDOMINAL PAIN: 0
SINUS PRESSURE: 0
RHINORRHEA: 0
CONSTIPATION: 0
NAUSEA: 0
SORE THROAT: 0

## 2024-03-04 NOTE — PROGRESS NOTES
[facility]  [address] PRO[address]   [facility] [address]  Dept: [phone]  Loc: [phone]     Jody Sorto (:  1965) is a 58 y.o. female, here for evaluation of the following chief complaint(s):  Oral Pain (C/O soreness on roof of mouth and tongue x 2 months. Has been on antibiotics and seen the dentist. Dentist thinks it may be burning mouth syndrome. No sores or lesions in mouth. )      ASSESSMENT/PLAN:  1. Burning tongue syndrome  -     Vitamin B12; Future  -     Vitamin B6; Future  -     CBC with Auto Differential; Future  -     lidocaine viscous hcl (XYLOCAINE) 2 % SOLN solution; Take 15 mLs by mouth every 3 hours as needed for Irritation, Disp-100 mL, R-3Normal    Check labs  Wean from elavil which may contribute to dry mouth  Viscous lidocaine prn    Return in about 4 weeks (around 2024) for follow up burning.    SUBJECTIVE/OBJECTIVE:  Oral Pain   This is a new problem. The current episode started more than 1 month ago. The problem occurs constantly. The problem has been unchanged. The pain is moderate. Associated symptoms include difficulty swallowing. Pertinent negatives include no fever, oral bleeding, sinus pressure or thermal sensitivity. Treatments tried: biotene, nystatin. The treatment provided no relief.   Has been present since 4 abx in short time.  Also increased stress.  Went to dentist who said oral mucosa looked normal, suggested burning mouth.    Review of Systems   Constitutional:  Positive for appetite change. Negative for chills, fatigue and fever.   HENT:  Negative for congestion, dental problem, drooling, mouth sores, rhinorrhea, sinus pressure and sore throat.    Cardiovascular:  Negative for chest pain and palpitations.   Gastrointestinal:  Negative for abdominal pain, constipation, diarrhea and nausea.   Musculoskeletal:  Negative for arthralgias and myalgias.   Neurological:  Negative for

## 2024-03-07 ENCOUNTER — TELEPHONE (OUTPATIENT)
Dept: FAMILY MEDICINE CLINIC | Age: 59
End: 2024-03-07

## 2024-03-07 LAB — PYRIDOXAL PHOS SERPL-SCNC: 42.9 NMOL/L (ref 20–125)

## 2024-03-07 NOTE — TELEPHONE ENCOUNTER
She can start the melatonin if she starts having any sleeping issues when is weaning from the medication.

## 2024-03-07 NOTE — TELEPHONE ENCOUNTER
Patient called and states the day after her appt she started with 3 sores in her mouth. Pt did not use the Lidocaine solution but wanted to let you know. Pt also could not remember if she was to start the Melatonin right away or wait until she was weaned off the Flexeril and Elavil.

## 2024-03-18 RX ORDER — BUPROPION HYDROCHLORIDE 300 MG/1
300 TABLET ORAL DAILY
Qty: 30 TABLET | Refills: 0 | Status: SHIPPED | OUTPATIENT
Start: 2024-03-18

## 2024-03-21 ENCOUNTER — PATIENT MESSAGE (OUTPATIENT)
Dept: FAMILY MEDICINE CLINIC | Age: 59
End: 2024-03-21

## 2024-03-21 DIAGNOSIS — K13.70 LESION OF ORAL MUCOSA: ICD-10-CM

## 2024-03-21 DIAGNOSIS — K14.6 BURNING TONGUE SYNDROME: Primary | ICD-10-CM

## 2024-03-22 RX ORDER — CYCLOBENZAPRINE HCL 10 MG
TABLET ORAL
Qty: 60 TABLET | Refills: 0 | Status: SHIPPED | OUTPATIENT
Start: 2024-03-22

## 2024-03-22 NOTE — TELEPHONE ENCOUNTER
From: Jody Sorto  To: Dr. Dixie Islas  Sent: 3/21/2024 7:23 PM EDT  Subject: Mouth and tongue soreness    I wanted to inform you that I have gotten ulcers on the roof of my mouth since our last visit. I would like to get further testing done. If I need to see an ENT, that is fine too. I have an appt on April 4th with you, but if we can do something before that, that would be great. The ulcers are very painful. I’m going to send you a picture of the sores on the roof of my mouth. It’s not the best pic. lol. Thanks!

## 2024-04-04 ENCOUNTER — OFFICE VISIT (OUTPATIENT)
Dept: FAMILY MEDICINE CLINIC | Age: 59
End: 2024-04-04
Payer: COMMERCIAL

## 2024-04-04 VITALS
HEART RATE: 64 BPM | SYSTOLIC BLOOD PRESSURE: 124 MMHG | WEIGHT: 203 LBS | DIASTOLIC BLOOD PRESSURE: 66 MMHG | OXYGEN SATURATION: 98 % | BODY MASS INDEX: 29.98 KG/M2 | RESPIRATION RATE: 14 BRPM

## 2024-04-04 DIAGNOSIS — F33.1 MODERATE EPISODE OF RECURRENT MAJOR DEPRESSIVE DISORDER (HCC): ICD-10-CM

## 2024-04-04 DIAGNOSIS — K14.6 BURNING TONGUE SYNDROME: Primary | ICD-10-CM

## 2024-04-04 LAB — RHEUMATOID FACT SERPL-ACNC: < 10 IU/ML (ref 0–13)

## 2024-04-04 PROCEDURE — 99214 OFFICE O/P EST MOD 30 MIN: CPT | Performed by: FAMILY MEDICINE

## 2024-04-04 PROCEDURE — G8419 CALC BMI OUT NRM PARAM NOF/U: HCPCS | Performed by: FAMILY MEDICINE

## 2024-04-04 PROCEDURE — 3017F COLORECTAL CA SCREEN DOC REV: CPT | Performed by: FAMILY MEDICINE

## 2024-04-04 PROCEDURE — G8427 DOCREV CUR MEDS BY ELIG CLIN: HCPCS | Performed by: FAMILY MEDICINE

## 2024-04-04 PROCEDURE — 1036F TOBACCO NON-USER: CPT | Performed by: FAMILY MEDICINE

## 2024-04-04 RX ORDER — BUPROPION HYDROCHLORIDE 150 MG/1
150 TABLET ORAL EVERY MORNING
Qty: 30 TABLET | Refills: 1 | Status: SHIPPED | OUTPATIENT
Start: 2024-04-04

## 2024-04-04 ASSESSMENT — ENCOUNTER SYMPTOMS
NAUSEA: 0
SORE THROAT: 0
RHINORRHEA: 0
ABDOMINAL PAIN: 0
CONSTIPATION: 0
DIARRHEA: 0

## 2024-04-06 LAB
ANA PAT SER IF-IMP: NORMAL
NUCLEAR IGG SER QL IA: DETECTED
NUCLEAR IGG SER QL IF: NORMAL

## 2024-04-08 DIAGNOSIS — R76.8 POSITIVE ANA (ANTINUCLEAR ANTIBODY): Primary | ICD-10-CM

## 2024-04-08 DIAGNOSIS — K14.6 BURNING TONGUE SYNDROME: ICD-10-CM

## 2024-04-16 ENCOUNTER — PATIENT MESSAGE (OUTPATIENT)
Dept: FAMILY MEDICINE CLINIC | Age: 59
End: 2024-04-16

## 2024-04-16 DIAGNOSIS — F41.9 ANXIETY: Primary | ICD-10-CM

## 2024-04-17 RX ORDER — BUPROPION HYDROCHLORIDE 300 MG/1
300 TABLET ORAL DAILY
Qty: 90 TABLET | Refills: 3 | Status: SHIPPED | OUTPATIENT
Start: 2024-04-17

## 2024-04-17 NOTE — TELEPHONE ENCOUNTER
From: Jody Sorto  To: Dr. Dixie Islas  Sent: 4/16/2024 8:46 PM EDT  Subject: Meds and labs     Hi, I wanted to pass along to you that I was unable to keep my Wellbutrin XL at 150mg daily. I went back to the 300mg. After approx 3 days, I was crying easily and anxious. I feel like I will have higher anxiety until I see the rheumatologist and ENT, and my appts are “months” away. I recently discontinued the Amitryptilline so I’m not sure with stopping one med and lowering the dose of the other antidepressant, if that was too much for me. I will need the Wellbutrin XL 300mg refilled please.   I was also wondering if a sed rate was drawn too at my last appt? Thank you!

## 2024-04-17 NOTE — PROGRESS NOTES
[facility] PROFESSIONAL [facility]  [address] PROGRESSIVE   [facility] [address]  Dept: [phone]  Loc: [phone]     Jody Sorto (:  1965) is a 58 y.o. female, here for evaluation of the following chief complaint(s):  Follow-up (Last seen 3/4/2024. No concerns. )      ASSESSMENT/PLAN:  1. Burning tongue syndrome  -     Sedimentation rate, automated; Future  -     KIRIT Screen with Reflex; Future  -     Rheumatoid Factor; Future  2. Moderate episode of recurrent major depressive disorder (HCC)  -     buPROPion (WELLBUTRIN XL) 150 MG extended release tablet; Take 1 tablet by mouth every morning, Disp-30 tablet, R-1Normal    Check KIRIT/rheum labs  Decrease wellbutrin to 150mg daily which may contribute to dry mouth  Viscous lidocaine prn  Has follow up with ent end of next month  Add b complex vitamin    No follow-ups on file.    SUBJECTIVE/OBJECTIVE:  Oral Pain   This is a new problem. The current episode started more than 1 month ago. The problem occurs constantly. The problem has been unchanged. The pain is moderate. Associated symptoms include difficulty swallowing. Pertinent negatives include no fever, oral bleeding, sinus pressure or thermal sensitivity. Treatments tried: biotene, nystatin. The treatment provided no relief.   Has been present since 4 abx in short time.  Also increased stress.  Went to dentist who said oral mucosa looked normal, suggested burning mouth. Has not tried viscous lidocaine b/c had ulcers.  These ulcers resolved but still dry.  Has weaned from elavil. Mood good.     Review of Systems   Constitutional:  Positive for appetite change. Negative for chills, fatigue and fever.   HENT:  Negative for congestion, dental problem, drooling, mouth sores, rhinorrhea, sinus pressure and sore throat.    Cardiovascular:  Negative for chest pain and palpitations.   Gastrointestinal:  Negative for abdominal pain, constipation, diarrhea and 
Venipuncture obtained for right arm. Patient tolerated well with no concerns at this time.  
Patent

## 2024-05-28 ENCOUNTER — OFFICE VISIT (OUTPATIENT)
Dept: ENT CLINIC | Age: 59
End: 2024-05-28
Payer: COMMERCIAL

## 2024-05-28 ENCOUNTER — NURSE ONLY (OUTPATIENT)
Dept: LAB | Age: 59
End: 2024-05-28

## 2024-05-28 VITALS
HEART RATE: 80 BPM | RESPIRATION RATE: 18 BRPM | DIASTOLIC BLOOD PRESSURE: 72 MMHG | WEIGHT: 204.6 LBS | SYSTOLIC BLOOD PRESSURE: 116 MMHG | TEMPERATURE: 97.1 F | BODY MASS INDEX: 30.3 KG/M2 | OXYGEN SATURATION: 98 % | HEIGHT: 69 IN

## 2024-05-28 DIAGNOSIS — E04.2 MULTIPLE THYROID NODULES: ICD-10-CM

## 2024-05-28 DIAGNOSIS — K14.6 BURNING TONGUE SYNDROME: Primary | ICD-10-CM

## 2024-05-28 DIAGNOSIS — R76.8 POSITIVE ANA (ANTINUCLEAR ANTIBODY): ICD-10-CM

## 2024-05-28 DIAGNOSIS — H04.123 DRY MOUTH AND EYES: ICD-10-CM

## 2024-05-28 DIAGNOSIS — G47.33 OSA ON CPAP: ICD-10-CM

## 2024-05-28 DIAGNOSIS — K14.6 BURNING TONGUE SYNDROME: ICD-10-CM

## 2024-05-28 DIAGNOSIS — J34.2 NASAL SEPTAL DEVIATION: ICD-10-CM

## 2024-05-28 DIAGNOSIS — R68.2 DRY MOUTH AND EYES: ICD-10-CM

## 2024-05-28 LAB
ERYTHROCYTE [SEDIMENTATION RATE] IN BLOOD BY WESTERGREN METHOD: 11 MM/HR (ref 0–20)
T4 FREE SERPL-MCNC: 1.5 NG/DL (ref 0.93–1.68)
TSH SERPL DL<=0.005 MIU/L-ACNC: 1.7 UIU/ML (ref 0.4–4.2)

## 2024-05-28 PROCEDURE — 3017F COLORECTAL CA SCREEN DOC REV: CPT | Performed by: REGISTERED NURSE

## 2024-05-28 PROCEDURE — G8417 CALC BMI ABV UP PARAM F/U: HCPCS | Performed by: REGISTERED NURSE

## 2024-05-28 PROCEDURE — 1036F TOBACCO NON-USER: CPT | Performed by: REGISTERED NURSE

## 2024-05-28 PROCEDURE — G8427 DOCREV CUR MEDS BY ELIG CLIN: HCPCS | Performed by: REGISTERED NURSE

## 2024-05-28 PROCEDURE — 99204 OFFICE O/P NEW MOD 45 MIN: CPT | Performed by: REGISTERED NURSE

## 2024-05-28 RX ORDER — LANOLIN ALCOHOL/MO/W.PET/CERES
3 CREAM (GRAM) TOPICAL DAILY
COMMUNITY

## 2024-05-28 RX ORDER — CYCLOBENZAPRINE HCL 10 MG
TABLET ORAL
Qty: 60 TABLET | Refills: 0 | Status: SHIPPED | OUTPATIENT
Start: 2024-05-28

## 2024-05-28 NOTE — PATIENT INSTRUCTIONS
Obtain thyroid labwork and remained of autoimmune work up  I will call with results  Keep follow up with rheumatology as scheduled  Thyroid US in six months and follow up afterward to review results and recheck throat/oral cavity

## 2024-05-28 NOTE — PROGRESS NOTES
5.40 mill/mm3 4.02 Low  3.82 Low  R 4.00 Low    Hemoglobin  12.0 - 16.0 gm/dl 11.9 Low  11.3 Low  12.1   Hematocrit  37.0 - 47.0 % 37.6 35.3 Low  38.0   MCV  81.0 - 99.0 fL 93.5 92.4 95.0   MCH  26.0 - 33.0 pg 29.6 29.6 R 30.3   MCHC  32.2 - 35.5 gm/dl 31.6 Low  32.0 R 31.8 Low    RDW-CV  11.5 - 14.5 % 12.8  13.2   RDW-SD  35.0 - 45.0 fL 43.9  45.6 High    Platelets  130 - 400 thou/mm3 234 225 233   MPV  9.4 - 12.4 fL 10.0 9.1 Low  10.1   Seg Neutrophils  % 63.8 72.4 R 57.9   Lymphocytes  % 24.3 16.5 R 29.2   Monocytes %  % 7.6  8.1   Eosinophils  % 3.4  3.9   Basophils  % 0.7 0.6 R 0.7   Immature Granulocytes %  % 0.2 0 0.2   Neutrophils Absolute  1.8 - 7.7 thou/mm3 2.6 5.1 2.4   Lymphocytes Absolute  1.0 - 4.8 thou/mm3 1.0 1.2 1.2   Monocytes Absolute  0.4 - 1.3 thou/mm3 0.3 Low   0.3 Low    Eosinophils Absolute  0.0 - 0.4 thou/mm3 0.1 0.2 R 0.2   Basophils Absolute  0.0 - 0.1 thou/mm3 0.0 0.0 0.0   Immature Grans (Abs)  0.00 - 0.07 thou/mm3 0.01 0.00 CM 0.01     Previous patient supplied image of oral cavity lesions in March of 2024 after stressful home life situation:    Assessment/Plan:      ICD-10-CM    1. Burning tongue syndrome  K14.6 Sjogren'S Antibodies (SS-A, SS-B)     Sedimentation Rate      2. Multiple thyroid nodules  E04.2 TSH     T3, Free     T4, Free     Thyroglobulin And Anti-Thyroglobulin Ab     US THYROID      3. Dry mouth and eyes  R68.2 Sjogren'S Antibodies (SS-A, SS-B)    H04.123 Sedimentation Rate      4. Positive KIRIT (antinuclear antibody)  R76.8 Sjogren'S Antibodies (SS-A, SS-B)     Sedimentation Rate           Reports of dry mouth and dry eyes; no visible lesions or concerning findings to oral cavity today. Resolution of burning tongue with sensation of 'scalded' gums and hard palate. Provided photo of patients previous eruption of oral lesions during stressful time in life in March of 2024. Positive KIRIT testing with upcoming appointment with rheumatology. Will obtain further work-up for

## 2024-05-29 LAB
T3FREE SERPL-MCNC: 2.8 PG/ML (ref 2–4.4)
THYROGLOBULIN: NORMAL

## 2024-05-30 LAB
ENA SS-A 60KD AB SER-ACNC: NORMAL
ENA SS-A IGG SER QL: NORMAL
ENA SS-B IGG SER IA-ACNC: 0 AU/ML (ref 0–40)

## 2024-06-24 ENCOUNTER — HOSPITAL ENCOUNTER (OUTPATIENT)
Dept: ULTRASOUND IMAGING | Age: 59
Discharge: HOME | End: 2024-06-24
Payer: COMMERCIAL

## 2024-06-24 DIAGNOSIS — K82.4 GALLBLADDER POLYP: ICD-10-CM

## 2024-06-24 PROCEDURE — 76705 ECHO EXAM OF ABDOMEN: CPT

## 2024-06-27 ENCOUNTER — NURSE ONLY (OUTPATIENT)
Dept: LAB | Age: 59
End: 2024-06-27

## 2024-06-27 ENCOUNTER — OFFICE VISIT (OUTPATIENT)
Dept: RHEUMATOLOGY | Age: 59
End: 2024-06-27
Payer: COMMERCIAL

## 2024-06-27 VITALS
DIASTOLIC BLOOD PRESSURE: 72 MMHG | WEIGHT: 202.8 LBS | SYSTOLIC BLOOD PRESSURE: 102 MMHG | HEIGHT: 69 IN | BODY MASS INDEX: 30.04 KG/M2 | OXYGEN SATURATION: 97 % | HEART RATE: 72 BPM

## 2024-06-27 DIAGNOSIS — R76.8 POSITIVE ANA (ANTINUCLEAR ANTIBODY): ICD-10-CM

## 2024-06-27 DIAGNOSIS — K13.79 RECURRENT ORAL ULCERS: Primary | ICD-10-CM

## 2024-06-27 DIAGNOSIS — K13.79 RECURRENT ORAL ULCERS: ICD-10-CM

## 2024-06-27 LAB
ALBUMIN SERPL BCG-MCNC: 4.8 G/DL (ref 3.5–5.1)
ALP SERPL-CCNC: 64 U/L (ref 38–126)
ALT SERPL W/O P-5'-P-CCNC: 23 U/L (ref 11–66)
ANION GAP SERPL CALC-SCNC: 9 MEQ/L (ref 8–16)
AST SERPL-CCNC: 20 U/L (ref 5–40)
BILIRUB SERPL-MCNC: 0.5 MG/DL (ref 0.3–1.2)
BUN SERPL-MCNC: 19 MG/DL (ref 7–22)
C3C SERPL-MCNC: 145 MG/DL (ref 90–180)
C4 SERPL-MCNC: 31 MG/DL (ref 10–40)
CALCIUM SERPL-MCNC: 9.8 MG/DL (ref 8.5–10.5)
CHLORIDE SERPL-SCNC: 102 MEQ/L (ref 98–111)
CO2 SERPL-SCNC: 30 MEQ/L (ref 23–33)
CREAT SERPL-MCNC: 1 MG/DL (ref 0.4–1.2)
CRP SERPL-MCNC: 0.51 MG/DL (ref 0–1)
ERYTHROCYTE [SEDIMENTATION RATE] IN BLOOD BY WESTERGREN METHOD: 20 MM/HR (ref 0–20)
GFR SERPL CREATININE-BSD FRML MDRD: 65 ML/MIN/1.73M2
GLUCOSE SERPL-MCNC: 97 MG/DL (ref 70–108)
POTASSIUM SERPL-SCNC: 4.1 MEQ/L (ref 3.5–5.2)
PROT SERPL-MCNC: 7.5 G/DL (ref 6.1–8)
SODIUM SERPL-SCNC: 141 MEQ/L (ref 135–145)

## 2024-06-27 PROCEDURE — G8417 CALC BMI ABV UP PARAM F/U: HCPCS | Performed by: INTERNAL MEDICINE

## 2024-06-27 PROCEDURE — 1036F TOBACCO NON-USER: CPT | Performed by: INTERNAL MEDICINE

## 2024-06-27 PROCEDURE — 99204 OFFICE O/P NEW MOD 45 MIN: CPT | Performed by: INTERNAL MEDICINE

## 2024-06-27 PROCEDURE — 3017F COLORECTAL CA SCREEN DOC REV: CPT | Performed by: INTERNAL MEDICINE

## 2024-06-27 PROCEDURE — G8427 DOCREV CUR MEDS BY ELIG CLIN: HCPCS | Performed by: INTERNAL MEDICINE

## 2024-06-27 RX ORDER — HYDROXYCHLOROQUINE SULFATE 200 MG/1
200 TABLET, FILM COATED ORAL 2 TIMES DAILY
Qty: 60 TABLET | Refills: 1 | Status: SHIPPED | OUTPATIENT
Start: 2024-06-27 | End: 2024-08-26

## 2024-06-27 NOTE — PROGRESS NOTES
26.0 - 33.0 pg 29.6   MCHC 32.2 - 35.5 gm/dl 31.6 (L)   MPV 9.4 - 12.4 fL 10.0   RDW-CV 11.5 - 14.5 % 12.8   RDW-SD 35.0 - 45.0 fL 43.9   Platelet Count 130 - 400 thou/mm3 234   Monocytes % % 7.6   Neutrophils Absolute 1.8 - 7.7 thou/mm3 2.6   Lymphocytes Absolute 1.0 - 4.8 thou/mm3 1.0   Monocytes Absolute 0.4 - 1.3 thou/mm3 0.3 (L)   Eosinophils Absolute 0.0 - 0.4 thou/mm3 0.1   Basophils Absolute 0.0 - 0.1 thou/mm3 0.0   Seg Neutrophils % 63.8   Lymphocytes % 24.3   Eosinophils % 3.4   Basophils % 0.7   Immature Grans (Abs) 0.00 - 0.07 thou/mm3 0.01   Immature Granulocytes % % 0.2   Nucleated Red Blood Cells /100 wbc 0   Vitamin B-12 211 - 911 pg/mL 576   Sed Rate, Automated 0 - 20 mm/hr 11      Latest Reference Range & Units 04/04/24 09:13 04/04/24 09:14 05/28/24 12:00 05/28/24 12:04   KIRIT SCREEN None Detected  Detected !      Anti SSA    SEE BELOW    Anti SSB 0 - 40 AU/mL    0   Rheumatoid Factor 0 - 13 IU/mL  < 10     Antinuclear AB Interpretive Comment  See Note      Antinuclear Antibody, Hep-2, IGG <1:80  < 1:80      !: Data is abnormal        IMPRESSION/RECOMMENDATIONS:      1. Positive KIRIT, recurrent oral ulcers: would like to evaluate for underlying CTD  -- assess AVISE CTD, C3, C4, CBC  -- trial of hydroxychloroquine 200 mg bid. Discussed with pt the benefits, risks and adverse effects of this medication. Patient expressed understanding.      RTC in 6 weeks        Orders Placed This Encounter   Procedures    C3 Complement     Standing Status:   Future     Number of Occurrences:   1     Standing Expiration Date:   12/27/2024    C4 Complement     Standing Status:   Future     Number of Occurrences:   1     Standing Expiration Date:   12/27/2024    Comprehensive Metabolic Panel     Standing Status:   Future     Number of Occurrences:   1     Standing Expiration Date:   12/27/2024    Sedimentation Rate     Standing Status:   Future     Number of Occurrences:   1     Standing Expiration Date:   12/27/2024

## 2024-07-01 ASSESSMENT — ENCOUNTER SYMPTOMS
COUGH: 0
NAUSEA: 0
VOMITING: 0
ABDOMINAL PAIN: 0
SHORTNESS OF BREATH: 1
BLOOD IN STOOL: 0
CONSTIPATION: 1

## 2024-07-09 ENCOUNTER — TELEPHONE (OUTPATIENT)
Dept: FAMILY MEDICINE CLINIC | Age: 59
End: 2024-07-09

## 2024-07-09 NOTE — TELEPHONE ENCOUNTER
Aleksey had called regarding claim that was denied last year. He thought this was taken care of. He received another bill regarding it. I gave him the customer service number that was in patient chart to contact them with further questions. [phone].

## 2024-07-09 NOTE — TELEPHONE ENCOUNTER
Patient was requesting to talk to [facility] Prior authorization dept due to a bill that they received for patients imaging and wanted to make sure that this had authorization done. She stated that they already talked to billing and billing told them to contact Prior authorization dept. I gave number [phone] but also wanted to reach out to you to make sure they get in contact with your department.  Thanks!

## 2024-07-10 ENCOUNTER — PATIENT MESSAGE (OUTPATIENT)
Dept: RHEUMATOLOGY | Age: 59
End: 2024-07-10

## 2024-07-10 NOTE — TELEPHONE ENCOUNTER
From: Jody Sorto  To: Dr. Ifrah Arenas  Sent: 7/10/2024 3:40 PM EDT  Subject: Lab results     I am inquiring about my lab results from 6/27/24. Will I be notified from your office on these results? My next appt is 8/28/24. Nine weeks is a long time to wait for these results. I understand the hydroxychloroquine trial is 6 wks and I’m okay with that. Thank you.

## 2024-07-10 NOTE — TELEPHONE ENCOUNTER
I called the patient to discuss with her. She stated her insurance told her the PA was never done. They stated the reference number is not in regards to her. They stated that is for a different person and we never filed a PA for her. Im not sure what else we can do for her, or where I should be sending her to get more information.

## 2024-07-10 NOTE — TELEPHONE ENCOUNTER
Since this has been over a yr ago when the services were rendered, I doubt the insurance will allow our dpt to attempt to resubmit. The appeals dpt will handle the claim at this point, if they haven't already done so

## 2024-07-10 NOTE — TELEPHONE ENCOUNTER
Thank you for the quick response.     I know you would not document this if it was not a true statement from insurance. Is there anything we can do to resolve this issue for the patient? Or do you have any idea what the next step is to get this resolved.

## 2024-07-10 NOTE — TELEPHONE ENCOUNTER
I was the specialist that initiated both of these MRI's and I would have never documented this information if it wasn't true. Our appeals dpt will attempt to overturn it, if their unable to do so the pt will only be responsible for the cost of the MRI's as if they were approved

## 2024-07-10 NOTE — TELEPHONE ENCOUNTER
I received a message back regarding this from prior authorization leadership. Details below.       Thanks for reaching out. I confirmed with our Prior Authorization Manager that there is a patient balance of 250.00 for a non-covered charge.  Both MRIs received a payment, but the insurance is saying there is a non-covered portion of 250.00.  Nothing is showing a denial for authorization. The Manager suggests having the patient contact their insurance directly for more details on the charge since they are the one to deny the charge.           I did call the patient and leave a detailed message advising that I will be out of office until 7/18 but that my team here will keep me posted regarding any concerns.

## 2024-07-10 NOTE — TELEPHONE ENCOUNTER
I don't see anything ordered from 5/3/2023. Are you talking in reference to the MRI's that was completed on 5/16/2023?

## 2024-07-30 RX ORDER — CYCLOBENZAPRINE HCL 10 MG
TABLET ORAL
Qty: 60 TABLET | Refills: 0 | Status: SHIPPED | OUTPATIENT
Start: 2024-07-30

## 2024-08-23 DIAGNOSIS — K13.79 RECURRENT ORAL ULCERS: ICD-10-CM

## 2024-08-23 DIAGNOSIS — R76.8 POSITIVE ANA (ANTINUCLEAR ANTIBODY): ICD-10-CM

## 2024-08-23 RX ORDER — HYDROXYCHLOROQUINE SULFATE 200 MG/1
200 TABLET, FILM COATED ORAL 2 TIMES DAILY
Qty: 60 TABLET | Refills: 0 | Status: SHIPPED | OUTPATIENT
Start: 2024-08-23

## 2024-08-28 ENCOUNTER — OFFICE VISIT (OUTPATIENT)
Dept: RHEUMATOLOGY | Age: 59
End: 2024-08-28
Payer: COMMERCIAL

## 2024-08-28 VITALS
SYSTOLIC BLOOD PRESSURE: 136 MMHG | HEART RATE: 82 BPM | BODY MASS INDEX: 30.93 KG/M2 | HEIGHT: 69 IN | DIASTOLIC BLOOD PRESSURE: 76 MMHG | OXYGEN SATURATION: 96 % | WEIGHT: 208.8 LBS

## 2024-08-28 DIAGNOSIS — M35.9 UNDIFFERENTIATED CONNECTIVE TISSUE DISEASE (HCC): Primary | ICD-10-CM

## 2024-08-28 PROCEDURE — G8427 DOCREV CUR MEDS BY ELIG CLIN: HCPCS | Performed by: INTERNAL MEDICINE

## 2024-08-28 PROCEDURE — 99214 OFFICE O/P EST MOD 30 MIN: CPT | Performed by: INTERNAL MEDICINE

## 2024-08-28 PROCEDURE — 1036F TOBACCO NON-USER: CPT | Performed by: INTERNAL MEDICINE

## 2024-08-28 PROCEDURE — G8417 CALC BMI ABV UP PARAM F/U: HCPCS | Performed by: INTERNAL MEDICINE

## 2024-08-28 PROCEDURE — 3017F COLORECTAL CA SCREEN DOC REV: CPT | Performed by: INTERNAL MEDICINE

## 2024-08-28 ASSESSMENT — ENCOUNTER SYMPTOMS
ABDOMINAL PAIN: 0
BLOOD IN STOOL: 0
SHORTNESS OF BREATH: 1
VOMITING: 0
NAUSEA: 0
COUGH: 0

## 2024-08-28 NOTE — PROGRESS NOTES
[facility]   Rheumatology Clinic Note      8/28/2024         CHIEF COMPLAINT:    Chief Complaint   Patient presents with    Follow-up     6 week f/u Recurrent oral ulcers      Other     Only had reoccurring ulcers once since being seen last in clinic.            HISTORY OF PRESENT ILLNESS:    58 y.o. 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 When seen last, she was started on hydroxychloroquine 200 mg twice daily.    Initially presented with episodes of oral ulcers and sensation of constant soreness in the roof of her mouth. The oral ulcers occurred twice before starting hydroxychloroquine. After being on hydroxychloroquine, had an episode of oral ulcers that resolved within 2 days.  However, continues to have soreness in her mouth.  Continues to have severe dry mouth.    Noted improvement in her joint pain after starting hydroxychloroquine.      Initial Consultation   6/27/2024:  Early March, had oral ulcers in the roof of the mouth.  Sensation of \"soreness\" in the gum lining with the teeth. Dental evaluation of good.  Episodes of \"sore areas\" on the tip of the tongue and sides of tongue.    Severe dry mouth since January 2024.  No dry eyes.  Has photosensitivity.  No skin rash.    No h/o pericarditis or pleurisy.  No Raynaud's.  H/o 2 miscarriages in the past. Total 4 pregnancies  No h/o DVT or PE. Positive anticardiolipin antibody testing in the past.        Past Medical History:     has a past medical history of Anticardiolipin antibody positive, Anxiety, Blood clotting tendency (HCC), Depression, Headache, Hyperlipidemia, PAC (premature atrial contraction), PVC (premature ventricular contraction), and Unspecified sleep apnea.    Past Surgical History:     has a past surgical history that includes Umbilical hernia repair (2 y.o.).    Current Medications:      Current Outpatient Medications:     hydroxychloroquine (PLAQUENIL) 200 MG tablet, Take 1 tablet by  cough.    Cardiovascular:  Negative for chest pain.   Gastrointestinal:  Negative for abdominal pain, blood in stool, nausea and vomiting.   Skin:  Negative for rash.          PHYSICAL EXAM:    Vitals:    /76 (Site: Left Upper Arm, Position: Sitting, Cuff Size: Medium Adult)   Pulse 82   Ht 1.753 m (5' 9.02\")   Wt 94.7 kg (208 lb 12.8 oz)   LMP 03/30/2013   SpO2 96%   BMI 30.82 kg/m²     Physical Exam  Constitutional:       General: She is not in acute distress.     Appearance: Normal appearance.   HENT:      Mouth/Throat:      Mouth: Mucous membranes are dry.      Pharynx: Oropharynx is clear.   Eyes:      Conjunctiva/sclera: Conjunctivae normal.   Pulmonary:      Effort: Pulmonary effort is normal.   Musculoskeletal:         General: No swelling or deformity.   Skin:     General: Skin is warm and dry.      Findings: No rash.   Neurological:      General: No focal deficit present.      Mental Status: She is alert and oriented to person, place, and time.      Gait: Gait normal.   Psychiatric:         Mood and Affect: Mood normal.            DATA:       Latest Reference Range & Units 06/27/24 11:31   Sodium 135 - 145 meq/L 141   Potassium 3.5 - 5.2 meq/L 4.1   Chloride 98 - 111 meq/L 102   CARBON DIOXIDE 23 - 33 meq/L 30   BUN,BUNPL 7 - 22 mg/dL 19   Creatinine 0.4 - 1.2 mg/dL 1.0   Anion Gap 8.0 - 16.0 meq/L 9.0   Est, Glom Filt Rate >60 ml/min/1.73m2 65   Glucose 70 - 108 mg/dL 97   Calcium 8.5 - 10.5 mg/dL 9.8   Total Protein 6.1 - 8.0 g/dL 7.5   CRP 0.00 - 1.00 mg/dl 0.51   Albumin 3.5 - 5.1 g/dL 4.8   Alkaline Phosphatase 38 - 126 U/L 64   ALT 11 - 66 U/L 23   AST 5 - 40 U/L 20   Total Bilirubin 0.3 - 1.2 mg/dL 0.5      Latest Reference Range & Units 06/27/24 11:31   Sed Rate, Automated 0 - 20 mm/hr 20      Latest Reference Range & Units 06/27/24 11:31   C3 Complement 90 - 180 mg/dL 145   Complement C4 10 - 40 mg/dL 31             IMPRESSION/RECOMMENDATIONS:      1. 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

## 2024-09-18 RX ORDER — CYCLOBENZAPRINE HCL 10 MG
TABLET ORAL
Qty: 60 TABLET | Refills: 0 | Status: SHIPPED | OUTPATIENT
Start: 2024-09-18

## 2024-09-22 DIAGNOSIS — K13.79 RECURRENT ORAL ULCERS: ICD-10-CM

## 2024-09-22 DIAGNOSIS — R76.8 POSITIVE ANA (ANTINUCLEAR ANTIBODY): ICD-10-CM

## 2024-09-23 RX ORDER — HYDROXYCHLOROQUINE SULFATE 200 MG/1
200 TABLET, FILM COATED ORAL 2 TIMES DAILY
Qty: 60 TABLET | Refills: 0 | Status: SHIPPED | OUTPATIENT
Start: 2024-09-23

## 2024-09-25 ENCOUNTER — PATIENT MESSAGE (OUTPATIENT)
Dept: FAMILY MEDICINE CLINIC | Age: 59
End: 2024-09-25

## 2024-09-25 DIAGNOSIS — K14.6 BURNING TONGUE SYNDROME: Primary | ICD-10-CM

## 2024-09-25 DIAGNOSIS — E78.2 MIXED HYPERLIPIDEMIA: ICD-10-CM

## 2024-10-01 ENCOUNTER — HOSPITAL ENCOUNTER (OUTPATIENT)
Age: 59
Discharge: HOME | End: 2024-10-01
Payer: COMMERCIAL

## 2024-10-01 DIAGNOSIS — K14.6 BURNING TONGUE SYNDROME: ICD-10-CM

## 2024-10-01 DIAGNOSIS — E78.2 MIXED HYPERLIPIDEMIA: ICD-10-CM

## 2024-10-01 LAB
BASOPHILS ABSOLUTE: 0 THOU/MM3 (ref 0–0.1)
BASOPHILS NFR BLD AUTO: 0.8 %
CHOLEST SERPL-MCNC: 168 MG/DL (ref 100–199)
DEPRECATED MEAN GLUCOSE BLD GHB EST-ACNC: 102 MG/DL (ref 70–126)
DEPRECATED RDW RBC AUTO: 43.9 FL (ref 35–45)
EOSINOPHIL NFR BLD AUTO: 4.4 %
EOSINOPHILS ABSOLUTE: 0.2 THOU/MM3 (ref 0–0.4)
ERYTHROCYTE [DISTWIDTH] IN BLOOD BY AUTOMATED COUNT: 12.9 % (ref 11.5–14.5)
HBA1C MFR BLD HPLC: 5.4 % (ref 4.4–6.4)
HCT VFR BLD AUTO: 37.1 % (ref 37–47)
HDLC SERPL-MCNC: 39 MG/DL
HGB BLD-MCNC: 11.6 GM/DL (ref 12–16)
IMM GRANULOCYTES # BLD AUTO: 0.01 THOU/MM3 (ref 0–0.07)
IMM GRANULOCYTES NFR BLD AUTO: 0.3 %
LDLC SERPL CALC-MCNC: 107 MG/DL
LYMPHOCYTES ABSOLUTE: 1.1 THOU/MM3 (ref 1–4.8)
LYMPHOCYTES NFR BLD AUTO: 28.2 %
MCH RBC QN AUTO: 29.1 PG (ref 26–33)
MCHC RBC AUTO-ENTMCNC: 31.3 GM/DL (ref 32.2–35.5)
MCV RBC AUTO: 93.2 FL (ref 81–99)
MONOCYTES ABSOLUTE: 0.4 THOU/MM3 (ref 0.4–1.3)
MONOCYTES NFR BLD AUTO: 9.7 %
NEUTROPHILS ABSOLUTE: 2.2 THOU/MM3 (ref 1.8–7.7)
NEUTROPHILS NFR BLD AUTO: 56.6 %
NRBC BLD AUTO-RTO: 0 /100 WBC
PLATELET # BLD AUTO: 229 THOU/MM3 (ref 130–400)
PMV BLD AUTO: 9.9 FL (ref 9.4–12.4)
RBC # BLD AUTO: 3.98 MILL/MM3 (ref 4.2–5.4)
TRIGL SERPL-MCNC: 112 MG/DL (ref 0–199)
WBC # BLD AUTO: 3.8 THOU/MM3 (ref 4.8–10.8)

## 2024-10-01 PROCEDURE — 80061 LIPID PANEL: CPT

## 2024-10-01 PROCEDURE — 83036 HEMOGLOBIN GLYCOSYLATED A1C: CPT

## 2024-10-01 PROCEDURE — 85025 COMPLETE CBC W/AUTO DIFF WBC: CPT

## 2024-10-01 PROCEDURE — 36415 COLL VENOUS BLD VENIPUNCTURE: CPT

## 2024-10-09 ENCOUNTER — PATIENT MESSAGE (OUTPATIENT)
Dept: FAMILY MEDICINE CLINIC | Age: 59
End: 2024-10-09

## 2024-10-10 ENCOUNTER — OFFICE VISIT (OUTPATIENT)
Dept: FAMILY MEDICINE CLINIC | Age: 59
End: 2024-10-10
Payer: COMMERCIAL

## 2024-10-10 VITALS
SYSTOLIC BLOOD PRESSURE: 108 MMHG | DIASTOLIC BLOOD PRESSURE: 62 MMHG | WEIGHT: 212 LBS | OXYGEN SATURATION: 100 % | BODY MASS INDEX: 31.4 KG/M2 | RESPIRATION RATE: 16 BRPM | HEART RATE: 82 BPM | HEIGHT: 69 IN

## 2024-10-10 DIAGNOSIS — D72.819 LEUKOPENIA, UNSPECIFIED TYPE: ICD-10-CM

## 2024-10-10 DIAGNOSIS — K14.6 BURNING TONGUE SYNDROME: ICD-10-CM

## 2024-10-10 DIAGNOSIS — Z00.00 ANNUAL PHYSICAL EXAM: Primary | ICD-10-CM

## 2024-10-10 PROCEDURE — G8484 FLU IMMUNIZE NO ADMIN: HCPCS | Performed by: FAMILY MEDICINE

## 2024-10-10 PROCEDURE — 99396 PREV VISIT EST AGE 40-64: CPT | Performed by: FAMILY MEDICINE

## 2024-10-10 RX ORDER — GABAPENTIN 100 MG/1
100 CAPSULE ORAL 3 TIMES DAILY
Qty: 90 CAPSULE | Refills: 1 | Status: SHIPPED | OUTPATIENT
Start: 2024-10-10 | End: 2025-04-08

## 2024-10-10 SDOH — ECONOMIC STABILITY: INCOME INSECURITY: HOW HARD IS IT FOR YOU TO PAY FOR THE VERY BASICS LIKE FOOD, HOUSING, MEDICAL CARE, AND HEATING?: NOT HARD AT ALL

## 2024-10-10 SDOH — ECONOMIC STABILITY: FOOD INSECURITY: WITHIN THE PAST 12 MONTHS, YOU WORRIED THAT YOUR FOOD WOULD RUN OUT BEFORE YOU GOT MONEY TO BUY MORE.: NEVER TRUE

## 2024-10-10 SDOH — ECONOMIC STABILITY: FOOD INSECURITY: WITHIN THE PAST 12 MONTHS, THE FOOD YOU BOUGHT JUST DIDN'T LAST AND YOU DIDN'T HAVE MONEY TO GET MORE.: NEVER TRUE

## 2024-10-10 ASSESSMENT — ENCOUNTER SYMPTOMS
SHORTNESS OF BREATH: 0
DIARRHEA: 0
VOMITING: 0
CONSTIPATION: 0
NAUSEA: 0
RHINORRHEA: 0
BACK PAIN: 1
SORE THROAT: 0
COLOR CHANGE: 0
WHEEZING: 0
APNEA: 0
ABDOMINAL PAIN: 0
SINUS PRESSURE: 0
COUGH: 0

## 2024-10-10 NOTE — PROGRESS NOTES
10/10/2024    Jody Sorto (:  1965) is a 59 y.o. female, here for a preventive medicine evaluation.    Subjective   Patient Active Problem List   Diagnosis    HIGH CHOLESTEROL    GERD (gastroesophageal reflux disease)    Obstructive sleep apnea on CPAP    Goiter    Mixed hyperlipidemia       Review of Systems   Constitutional:  Negative for appetite change, chills, fatigue and fever.   HENT:  Positive for mouth sores (roof of mouth intermittently). Negative for congestion, postnasal drip, rhinorrhea, sinus pressure and sore throat.    Respiratory:  Negative for apnea, cough, shortness of breath and wheezing.    Cardiovascular:  Negative for chest pain, palpitations and leg swelling.   Gastrointestinal:  Negative for abdominal pain, constipation, diarrhea, nausea and vomiting.   Genitourinary:  Negative for difficulty urinating, dysuria, frequency and urgency.   Musculoskeletal:  Positive for arthralgias and back pain. Negative for myalgias.   Skin:  Negative for color change and rash.   Neurological:  Negative for dizziness, light-headedness and headaches.   Psychiatric/Behavioral:  Negative for decreased concentration and sleep disturbance. The patient is not nervous/anxious.        Prior to Visit Medications    Medication Sig Taking? Authorizing Provider   gabapentin (NEURONTIN) 100 MG capsule Take 1 capsule by mouth 3 times daily for 180 days. Intended supply: 90 days Yes Dixie Islas MD   cyclobenzaprine (FLEXERIL) 10 MG tablet TAKE 1 TABLET BY MOUTH TWICE DAILY AS NEEDED FOR MUSCLE SPASM Yes Dixie Islas MD   melatonin 3 MG TABS tablet Take 1 tablet by mouth daily Yes Sharon Pollock MD   buPROPion (WELLBUTRIN XL) 300 MG extended release tablet Take 1 tablet by mouth daily Yes Dixie Islas MD   atorvastatin (LIPITOR) 10 MG tablet Take 1 tablet by mouth daily Yes Dixie Islas MD   Wheat Dextrin (BENEFIBER HEALTHY SHAPE PO)  Yes Sharon Pollock MD   pantoprazole (PROTONIX)

## 2024-10-22 RX ORDER — CYCLOBENZAPRINE HCL 10 MG
TABLET ORAL
Qty: 60 TABLET | Refills: 0 | Status: SHIPPED | OUTPATIENT
Start: 2024-10-22

## 2024-10-24 ENCOUNTER — TELEPHONE (OUTPATIENT)
Dept: FAMILY MEDICINE CLINIC | Age: 59
End: 2024-10-24

## 2024-10-24 DIAGNOSIS — D72.819 LEUKOPENIA, UNSPECIFIED TYPE: Primary | ICD-10-CM

## 2024-10-24 NOTE — TELEPHONE ENCOUNTER
Letter from the [facility] stating that due to patient acuity, and extended wait times for benign hematology, they are unable to accept referral. Please advise.

## 2024-10-28 ENCOUNTER — TELEPHONE (OUTPATIENT)
Dept: ENT CLINIC | Age: 59
End: 2024-10-28

## 2024-10-28 ENCOUNTER — HOSPITAL ENCOUNTER (OUTPATIENT)
Dept: ULTRASOUND IMAGING | Age: 59
Discharge: HOME | End: 2024-10-28
Attending: REGISTERED NURSE
Payer: COMMERCIAL

## 2024-10-28 DIAGNOSIS — E04.2 MULTIPLE THYROID NODULES: Primary | ICD-10-CM

## 2024-10-28 PROCEDURE — 76536 US EXAM OF HEAD AND NECK: CPT

## 2024-10-29 NOTE — TELEPHONE ENCOUNTER
I called Desiree and relayed the information.   She said to go ahead and set up the FNA.  I let her know we would place the orders and someone would be reaching out to her to get it scheduled.

## 2024-10-29 NOTE — TELEPHONE ENCOUNTER
Please call patient and inform her that her most recent thyroid US notes some growth to one of the nodules on the left side and they are recommending an ultrasound guided fine needle aspiration to monitor/obtain pathology. If she is agreeable to this I will place orders so that it can be completed prior to follow up as scheduled with me to review.       IMPRESSION:  1. A 1.3 x 0.7 x 0.5 cm hypoechoic nodule is seen in the mid left thyroid lobe  is a TR 5 nodule. Ultrasound-guided fine-needle aspiration is recommended.  2. Other subcentimeter thyroid nodules are as described above.  3. Moderate thyromegaly.

## 2024-10-31 ENCOUNTER — TELEPHONE (OUTPATIENT)
Dept: FAMILY MEDICINE CLINIC | Age: 59
End: 2024-10-31

## 2024-10-31 NOTE — TELEPHONE ENCOUNTER
Dr. Manpreet Price in [place]. I called his office first and they said yes they are taking new patients.

## 2024-10-31 NOTE — TELEPHONE ENCOUNTER
The referral for Hematology Oncology was faxed back saying they are not currently taking external patients for this diagnosis.

## 2024-11-05 ENCOUNTER — TELEPHONE (OUTPATIENT)
Dept: FAMILY MEDICINE CLINIC | Age: 59
End: 2024-11-05

## 2024-11-05 NOTE — TELEPHONE ENCOUNTER
[facility] called to say they think the referral was sent to the wrong place as it said [facility] on it.  I had it changed to Dr Price at [facility] and faxed it to them at their request

## 2024-11-05 NOTE — TELEPHONE ENCOUNTER
The [facility] called stating they receive a referral fax from one of our patients but was unable to read the documents. Resent fax to [phone].

## 2024-11-06 ENCOUNTER — HOSPITAL ENCOUNTER (OUTPATIENT)
Dept: ULTRASOUND IMAGING | Age: 59
Discharge: HOME | End: 2024-11-06
Payer: COMMERCIAL

## 2024-11-06 DIAGNOSIS — E04.2 MULTIPLE THYROID NODULES: ICD-10-CM

## 2024-11-06 PROCEDURE — 88177 CYTP FNA EVAL EA ADDL: CPT

## 2024-11-06 PROCEDURE — 88172 CYTP DX EVAL FNA 1ST EA SITE: CPT

## 2024-11-06 PROCEDURE — 76942 ECHO GUIDE FOR BIOPSY: CPT

## 2024-11-06 PROCEDURE — 88173 CYTOPATH EVAL FNA REPORT: CPT

## 2024-11-12 ENCOUNTER — LAB (OUTPATIENT)
Dept: LAB | Age: 59
End: 2024-11-12

## 2024-11-18 ENCOUNTER — PATIENT MESSAGE (OUTPATIENT)
Dept: FAMILY MEDICINE CLINIC | Age: 59
End: 2024-11-18

## 2024-11-21 ENCOUNTER — PATIENT MESSAGE (OUTPATIENT)
Dept: RHEUMATOLOGY | Age: 59
End: 2024-11-21

## 2024-11-21 LAB — CYTOLOGY THIN PREP PAP: NORMAL

## 2024-12-02 NOTE — TELEPHONE ENCOUNTER
Patient had an appointment with Jayla on 11/27/2024 but, she cancelled this appointment because she is following up with other specialists for her concerns.

## 2024-12-16 RX ORDER — ATORVASTATIN CALCIUM 10 MG/1
10 TABLET, FILM COATED ORAL DAILY
Qty: 30 TABLET | Refills: 0 | Status: SHIPPED | OUTPATIENT
Start: 2024-12-16

## 2024-12-17 ENCOUNTER — HOSPITAL ENCOUNTER (OUTPATIENT)
Age: 59
Discharge: HOME | End: 2024-12-17
Payer: COMMERCIAL

## 2024-12-17 LAB
CRP SERPL-MCNC: < 0.3 MG/DL (ref 0–1)
ERYTHROCYTE [SEDIMENTATION RATE] IN BLOOD BY WESTERGREN METHOD: 14 MM/HR (ref 0–20)
HGB RETIC QN AUTO: 31.9 PG (ref 28.2–35.7)
IMM RETICS NFR: 14.1 % (ref 3–15.9)
IRON SATN MFR SERPL: 26 % (ref 20–50)
IRON SERPL-MCNC: 67 UG/DL (ref 50–170)
LDH SERPL L TO P-CCNC: 173 U/L (ref 100–190)
RETICS # AUTO: 60 THOU/MM3 (ref 20–115)
RETICS/RBC NFR AUTO: 1.5 % (ref 0.5–2)
TIBC SERPL-MCNC: 255 UG/DL (ref 171–450)

## 2024-12-17 PROCEDURE — 82607 VITAMIN B-12: CPT

## 2024-12-17 PROCEDURE — 85046 RETICYTE/HGB CONCENTRATE: CPT

## 2024-12-17 PROCEDURE — 82746 ASSAY OF FOLIC ACID SERUM: CPT

## 2024-12-17 PROCEDURE — 36415 COLL VENOUS BLD VENIPUNCTURE: CPT

## 2024-12-17 PROCEDURE — 82232 ASSAY OF BETA-2 PROTEIN: CPT

## 2024-12-17 PROCEDURE — 83010 ASSAY OF HAPTOGLOBIN QUANT: CPT

## 2024-12-17 PROCEDURE — 84165 PROTEIN E-PHORESIS SERUM: CPT

## 2024-12-17 PROCEDURE — 82668 ASSAY OF ERYTHROPOIETIN: CPT

## 2024-12-17 PROCEDURE — 85651 RBC SED RATE NONAUTOMATED: CPT

## 2024-12-17 PROCEDURE — 86334 IMMUNOFIX E-PHORESIS SERUM: CPT

## 2024-12-17 PROCEDURE — 83615 LACTATE (LD) (LDH) ENZYME: CPT

## 2024-12-17 PROCEDURE — 82728 ASSAY OF FERRITIN: CPT

## 2024-12-17 PROCEDURE — 86140 C-REACTIVE PROTEIN: CPT

## 2024-12-17 PROCEDURE — 82784 ASSAY IGA/IGD/IGG/IGM EACH: CPT

## 2024-12-17 PROCEDURE — 84466 ASSAY OF TRANSFERRIN: CPT

## 2024-12-17 PROCEDURE — 84155 ASSAY OF PROTEIN SERUM: CPT

## 2024-12-17 PROCEDURE — 83883 ASSAY NEPHELOMETRY NOT SPEC: CPT

## 2024-12-17 PROCEDURE — 83540 ASSAY OF IRON: CPT

## 2024-12-18 LAB
FERRITIN SERPL IA-MCNC: 160 NG/ML (ref 10–291)
FOLATE SERPL-MCNC: 11.8 NG/ML (ref 4.8–24.2)
FREE KAPPA/LAMBDA RATIO: 1.75 (ref 0.22–1.74)
HAPTOGLOB SERPL-MCNC: 164 MG/DL (ref 30–200)
IGA SERPL-MCNC: 197 MG/DL (ref 70–400)
IGG SERPL-MCNC: 1037 MG/DL (ref 700–1600)
IGM SERPL-MCNC: 69 MG/DL (ref 40–230)
KAPPA LC FREE SER-MCNC: 25.2 MG/L
LAMBDA LC FREE SERPL-MCNC: 14.4 MG/L (ref 4.2–27.7)
REVIEWED BY: NORMAL
SMEAR REVIEW: NORMAL
TRANSFERRIN SERPL-MCNC: 223 MG/DL (ref 200–360)
VIT B12 SERPL-MCNC: 657 PG/ML (ref 211–911)

## 2024-12-18 RX ORDER — CYCLOBENZAPRINE HCL 10 MG
TABLET ORAL
Qty: 60 TABLET | Refills: 0 | Status: SHIPPED | OUTPATIENT
Start: 2024-12-18

## 2024-12-18 RX ORDER — VALACYCLOVIR HYDROCHLORIDE 1 G/1
TABLET, FILM COATED ORAL
Qty: 6 TABLET | Refills: 0 | Status: SHIPPED | OUTPATIENT
Start: 2024-12-18

## 2024-12-19 LAB
B2 MICROGLOB SERPL-MCNC: 2.2 MG/L
EPO SERPL-ACNC: 15 MU/ML (ref 4–27)

## 2024-12-21 LAB — IMMUNOFIXATION WITH QUANT: NORMAL

## 2025-01-15 RX ORDER — ATORVASTATIN CALCIUM 10 MG/1
10 TABLET, FILM COATED ORAL DAILY
Qty: 90 TABLET | Refills: 3 | Status: SHIPPED | OUTPATIENT
Start: 2025-01-15

## 2025-02-25 RX ORDER — CYCLOBENZAPRINE HCL 10 MG
TABLET ORAL
Qty: 60 TABLET | Refills: 0 | Status: SHIPPED | OUTPATIENT
Start: 2025-02-25

## 2025-04-14 DIAGNOSIS — F41.9 ANXIETY: ICD-10-CM

## 2025-04-14 RX ORDER — BUPROPION HYDROCHLORIDE 300 MG/1
300 TABLET ORAL DAILY
Qty: 90 TABLET | Refills: 0 | Status: SHIPPED | OUTPATIENT
Start: 2025-04-14

## 2025-04-14 NOTE — TELEPHONE ENCOUNTER
This medication refill is regarding a electronic request.  Refill requested by patient.    Requested Prescriptions     Pending Prescriptions Disp Refills    buPROPion (WELLBUTRIN XL) 300 MG extended release tablet [Pharmacy Med Name: buPROPion HCl ER (XL) 300 MG Oral Tablet Extended Release 24 Hour] 90 tablet 0     Sig: Take 1 tablet by mouth once daily       Date of last visit: 10/10/2024  Date of next visit: 4/30/2025  Date of last refill: 04/17/2024  Pharmacy Name: [facility]

## 2025-04-22 RX ORDER — CYCLOBENZAPRINE HCL 10 MG
TABLET ORAL
Qty: 60 TABLET | Refills: 0 | Status: SHIPPED | OUTPATIENT
Start: 2025-04-22

## 2025-04-23 ASSESSMENT — PATIENT HEALTH QUESTIONNAIRE - PHQ9
9. THOUGHTS THAT YOU WOULD BE BETTER OFF DEAD, OR OF HURTING YOURSELF: NOT AT ALL
SUM OF ALL RESPONSES TO PHQ QUESTIONS 1-9: 3
2. FEELING DOWN, DEPRESSED OR HOPELESS: NOT AT ALL
4. FEELING TIRED OR HAVING LITTLE ENERGY: MORE THAN HALF THE DAYS
8. MOVING OR SPEAKING SO SLOWLY THAT OTHER PEOPLE COULD HAVE NOTICED. OR THE OPPOSITE, BEING SO FIGETY OR RESTLESS THAT YOU HAVE BEEN MOVING AROUND A LOT MORE THAN USUAL: NOT AT ALL
3. TROUBLE FALLING OR STAYING ASLEEP: SEVERAL DAYS
SUM OF ALL RESPONSES TO PHQ QUESTIONS 1-9: 3
9. THOUGHTS THAT YOU WOULD BE BETTER OFF DEAD, OR OF HURTING YOURSELF: NOT AT ALL
SUM OF ALL RESPONSES TO PHQ QUESTIONS 1-9: 3
5. POOR APPETITE OR OVEREATING: NOT AT ALL
8. MOVING OR SPEAKING SO SLOWLY THAT OTHER PEOPLE COULD HAVE NOTICED. OR THE OPPOSITE - BEING SO FIDGETY OR RESTLESS THAT YOU HAVE BEEN MOVING AROUND A LOT MORE THAN USUAL: NOT AT ALL
5. POOR APPETITE OR OVEREATING: NOT AT ALL
SUM OF ALL RESPONSES TO PHQ QUESTIONS 1-9: 3
7. TROUBLE CONCENTRATING ON THINGS, SUCH AS READING THE NEWSPAPER OR WATCHING TELEVISION: NOT AT ALL
1. LITTLE INTEREST OR PLEASURE IN DOING THINGS: NOT AT ALL
4. FEELING TIRED OR HAVING LITTLE ENERGY: MORE THAN HALF THE DAYS
3. TROUBLE FALLING OR STAYING ASLEEP: SEVERAL DAYS
SUM OF ALL RESPONSES TO PHQ QUESTIONS 1-9: 3
6. FEELING BAD ABOUT YOURSELF - OR THAT YOU ARE A FAILURE OR HAVE LET YOURSELF OR YOUR FAMILY DOWN: NOT AT ALL
10. IF YOU CHECKED OFF ANY PROBLEMS, HOW DIFFICULT HAVE THESE PROBLEMS MADE IT FOR YOU TO DO YOUR WORK, TAKE CARE OF THINGS AT HOME, OR GET ALONG WITH OTHER PEOPLE: SOMEWHAT DIFFICULT
7. TROUBLE CONCENTRATING ON THINGS, SUCH AS READING THE NEWSPAPER OR WATCHING TELEVISION: NOT AT ALL
6. FEELING BAD ABOUT YOURSELF - OR THAT YOU ARE A FAILURE OR HAVE LET YOURSELF OR YOUR FAMILY DOWN: NOT AT ALL
2. FEELING DOWN, DEPRESSED OR HOPELESS: NOT AT ALL
10. IF YOU CHECKED OFF ANY PROBLEMS, HOW DIFFICULT HAVE THESE PROBLEMS MADE IT FOR YOU TO DO YOUR WORK, TAKE CARE OF THINGS AT HOME, OR GET ALONG WITH OTHER PEOPLE: SOMEWHAT DIFFICULT
1. LITTLE INTEREST OR PLEASURE IN DOING THINGS: NOT AT ALL

## 2025-04-27 SDOH — ECONOMIC STABILITY: FOOD INSECURITY: WITHIN THE PAST 12 MONTHS, THE FOOD YOU BOUGHT JUST DIDN'T LAST AND YOU DIDN'T HAVE MONEY TO GET MORE.: NEVER TRUE

## 2025-04-27 SDOH — ECONOMIC STABILITY: FOOD INSECURITY: WITHIN THE PAST 12 MONTHS, YOU WORRIED THAT YOUR FOOD WOULD RUN OUT BEFORE YOU GOT MONEY TO BUY MORE.: NEVER TRUE

## 2025-04-27 SDOH — ECONOMIC STABILITY: TRANSPORTATION INSECURITY
IN THE PAST 12 MONTHS, HAS THE LACK OF TRANSPORTATION KEPT YOU FROM MEDICAL APPOINTMENTS OR FROM GETTING MEDICATIONS?: NO

## 2025-04-27 SDOH — ECONOMIC STABILITY: INCOME INSECURITY: IN THE LAST 12 MONTHS, WAS THERE A TIME WHEN YOU WERE NOT ABLE TO PAY THE MORTGAGE OR RENT ON TIME?: NO

## 2025-04-30 ENCOUNTER — OFFICE VISIT (OUTPATIENT)
Dept: FAMILY MEDICINE CLINIC | Age: 60
End: 2025-04-30
Payer: COMMERCIAL

## 2025-04-30 VITALS
DIASTOLIC BLOOD PRESSURE: 78 MMHG | SYSTOLIC BLOOD PRESSURE: 126 MMHG | WEIGHT: 205.8 LBS | OXYGEN SATURATION: 98 % | RESPIRATION RATE: 16 BRPM | HEIGHT: 69 IN | BODY MASS INDEX: 30.48 KG/M2 | TEMPERATURE: 98.1 F | HEART RATE: 65 BPM

## 2025-04-30 DIAGNOSIS — K13.70 LESION OF ORAL MUCOSA: Primary | ICD-10-CM

## 2025-04-30 DIAGNOSIS — G89.29 CHRONIC MIDLINE LOW BACK PAIN WITHOUT SCIATICA: ICD-10-CM

## 2025-04-30 DIAGNOSIS — M54.50 CHRONIC MIDLINE LOW BACK PAIN WITHOUT SCIATICA: ICD-10-CM

## 2025-04-30 DIAGNOSIS — E78.2 MIXED HYPERLIPIDEMIA: ICD-10-CM

## 2025-04-30 DIAGNOSIS — D72.819 LEUKOPENIA, UNSPECIFIED TYPE: ICD-10-CM

## 2025-04-30 DIAGNOSIS — R73.01 ELEVATED FASTING GLUCOSE: ICD-10-CM

## 2025-04-30 PROCEDURE — 1036F TOBACCO NON-USER: CPT | Performed by: FAMILY MEDICINE

## 2025-04-30 PROCEDURE — G8417 CALC BMI ABV UP PARAM F/U: HCPCS | Performed by: FAMILY MEDICINE

## 2025-04-30 PROCEDURE — 3017F COLORECTAL CA SCREEN DOC REV: CPT | Performed by: FAMILY MEDICINE

## 2025-04-30 PROCEDURE — G8427 DOCREV CUR MEDS BY ELIG CLIN: HCPCS | Performed by: FAMILY MEDICINE

## 2025-04-30 PROCEDURE — 99215 OFFICE O/P EST HI 40 MIN: CPT | Performed by: FAMILY MEDICINE

## 2025-04-30 RX ORDER — CLOTRIMAZOLE 10 MG/1
10 LOZENGE ORAL
Qty: 50 TABLET | Refills: 0 | Status: SHIPPED | OUTPATIENT
Start: 2025-04-30 | End: 2025-05-10

## 2025-04-30 ASSESSMENT — ENCOUNTER SYMPTOMS
RHINORRHEA: 0
DIARRHEA: 0
FACIAL SWELLING: 0
NAUSEA: 0
SORE THROAT: 0
SHORTNESS OF BREATH: 0
COUGH: 0
WHEEZING: 0
BACK PAIN: 1
CONSTIPATION: 0
ABDOMINAL PAIN: 0

## 2025-04-30 NOTE — PROGRESS NOTES
significantly improving her tongue condition. Adherence to the medication regimen for 5 days included avoiding sugar and yeast, but this was not continued while on vacation. Tongue is better now, but still slightly sore. Consumption of Gatorade, wine, and sugar worsens her tongue condition, with dehydration suspected as a contributing factor.    A history of right-sided facial numbness led to an ER visit and a non-contrast CT scan of her head. An MRI of her brain was performed to investigate potential trigeminal neuralgia. A few episodes of less severe facial numbness have occurred since the MRI, effectively managed with Motrin. A neurologist appointment is scheduled for 06/2025.    Chronic back pain persists, managed with chiropractic care and massage therapy. Motrin 800 mg twice daily is occasionally required for pain management. A course of physical therapy was completed 1.5 years ago, and another round is being considered. A TENS unit has been purchased for home use and will be incorporated into her treatment regimen.    Consultation with a hematologist due to concerns about low white blood cell count revealed it as a long-standing issue, but a gradual decline in red blood cell count over the past 6 years was noted. A comprehensive workup initially returned abnormal results at the ambulatory care center in [place]. The hematologist recommended repeating the tests at a hospital in 04/2025, which returned normal results. The hematologist suggested a CBC twice yearly, with a letter provided to that effect. Hemoglobin level was 11.9 and all iron studies were normal. Consideration of iron supplementation is ongoing, with inquiries about its potential benefits.    Blood sugar was 102 on a comprehensive panel, slightly elevated while fasting. Concerns arise due to giving up sweets for lunch and not consuming them during work. Consideration of a hemoglobin A1c test later in 10/2025 is ongoing.    Review of Systems

## 2025-06-16 RX ORDER — CYCLOBENZAPRINE HCL 10 MG
TABLET ORAL
Qty: 60 TABLET | Refills: 0 | Status: SHIPPED | OUTPATIENT
Start: 2025-06-16

## 2025-06-27 ENCOUNTER — TRANSCRIBE ORDERS (OUTPATIENT)
Dept: ADMINISTRATIVE | Age: 60
End: 2025-06-27

## 2025-06-27 DIAGNOSIS — R51.9 FACIAL PAIN: Primary | ICD-10-CM

## 2025-06-27 DIAGNOSIS — G50.0 TRIGEMINAL NEURALGIA: ICD-10-CM

## 2025-06-27 DIAGNOSIS — G43.909 MIGRAINE WITHOUT STATUS MIGRAINOSUS, NOT INTRACTABLE, UNSPECIFIED MIGRAINE TYPE: ICD-10-CM

## 2025-07-07 DIAGNOSIS — F41.9 ANXIETY: Primary | ICD-10-CM

## 2025-07-07 RX ORDER — DIAZEPAM 2 MG/1
2 TABLET ORAL DAILY PRN
Qty: 3 TABLET | Refills: 0 | Status: SHIPPED | OUTPATIENT
Start: 2025-07-07 | End: 2025-07-17

## 2025-07-16 DIAGNOSIS — F41.9 ANXIETY: ICD-10-CM

## 2025-07-16 RX ORDER — BUPROPION HYDROCHLORIDE 300 MG/1
300 TABLET ORAL DAILY
Qty: 90 TABLET | Refills: 0 | Status: SHIPPED | OUTPATIENT
Start: 2025-07-16

## 2025-07-17 ENCOUNTER — HOSPITAL ENCOUNTER (OUTPATIENT)
Dept: MRI IMAGING | Age: 60
Discharge: HOME | End: 2025-07-17
Attending: RADIOLOGY

## 2025-07-17 DIAGNOSIS — Z00.6 EXAMINATION FOR NORMAL COMPARISON OR CONTROL IN CLINICAL RESEARCH: ICD-10-CM

## 2025-07-23 ENCOUNTER — HOSPITAL ENCOUNTER (OUTPATIENT)
Dept: MRI IMAGING | Age: 60
Discharge: HOME | End: 2025-07-23
Payer: COMMERCIAL

## 2025-07-23 DIAGNOSIS — G43.909 MIGRAINE WITHOUT STATUS MIGRAINOSUS, NOT INTRACTABLE, UNSPECIFIED MIGRAINE TYPE: ICD-10-CM

## 2025-07-23 DIAGNOSIS — G50.0 TRIGEMINAL NEURALGIA: ICD-10-CM

## 2025-07-23 DIAGNOSIS — R51.9 FACIAL PAIN: ICD-10-CM

## 2025-07-23 PROCEDURE — 70544 MR ANGIOGRAPHY HEAD W/O DYE: CPT

## 2025-07-28 ENCOUNTER — OFFICE VISIT (OUTPATIENT)
Dept: FAMILY MEDICINE CLINIC | Age: 60
End: 2025-07-28
Payer: COMMERCIAL

## 2025-07-28 VITALS
DIASTOLIC BLOOD PRESSURE: 60 MMHG | SYSTOLIC BLOOD PRESSURE: 100 MMHG | WEIGHT: 205 LBS | HEIGHT: 69 IN | BODY MASS INDEX: 30.36 KG/M2 | HEART RATE: 82 BPM | RESPIRATION RATE: 16 BRPM | OXYGEN SATURATION: 97 %

## 2025-07-28 DIAGNOSIS — M54.42 ACUTE BILATERAL LOW BACK PAIN WITH LEFT-SIDED SCIATICA: Primary | ICD-10-CM

## 2025-07-28 DIAGNOSIS — M51.369 BULGING LUMBAR DISC: ICD-10-CM

## 2025-07-28 PROCEDURE — G8427 DOCREV CUR MEDS BY ELIG CLIN: HCPCS | Performed by: FAMILY MEDICINE

## 2025-07-28 PROCEDURE — G8417 CALC BMI ABV UP PARAM F/U: HCPCS | Performed by: FAMILY MEDICINE

## 2025-07-28 PROCEDURE — 99214 OFFICE O/P EST MOD 30 MIN: CPT | Performed by: FAMILY MEDICINE

## 2025-07-28 PROCEDURE — 3017F COLORECTAL CA SCREEN DOC REV: CPT | Performed by: FAMILY MEDICINE

## 2025-07-28 PROCEDURE — 1036F TOBACCO NON-USER: CPT | Performed by: FAMILY MEDICINE

## 2025-07-28 RX ORDER — TRAMADOL HYDROCHLORIDE 50 MG/1
50 TABLET ORAL EVERY 6 HOURS PRN
Qty: 21 TABLET | Refills: 0 | Status: SHIPPED | OUTPATIENT
Start: 2025-07-28 | End: 2025-07-28

## 2025-07-28 RX ORDER — TRAMADOL HYDROCHLORIDE 50 MG/1
50 TABLET ORAL EVERY 8 HOURS PRN
Qty: 21 TABLET | Refills: 0 | Status: SHIPPED | OUTPATIENT
Start: 2025-07-28 | End: 2025-08-04

## 2025-07-28 ASSESSMENT — ENCOUNTER SYMPTOMS: BACK PAIN: 1

## 2025-07-28 NOTE — PROGRESS NOTES
Flexeril, and gabapentin, which provide some relief. She has previously tolerated Norco and tramadol well.    She has a history of disc protrusion at L5-S1, as revealed by an MRI two years ago. She underwent physical therapy two years ago. She has previously taken prednisone, which caused heart palpitations, and a steroid injection during a bout of COVID-19 that resulted in dry mouth.    Social History:  Marital Status:     PAST SURGICAL HISTORY:  She had both hip surgeries at [place].    Review of Systems   Constitutional:  Positive for activity change. Negative for chills and fever.   Musculoskeletal:  Positive for arthralgias, back pain, gait problem and myalgias.   Neurological:  Negative for dizziness and headaches.   Psychiatric/Behavioral:  Positive for sleep disturbance. The patient is not nervous/anxious.           Objective   Blood pressure 100/60, pulse 82, resp. rate 16, height 1.753 m (5' 9\"), weight 93 kg (205 lb), last menstrual period 03/30/2013, SpO2 97%.  Physical Exam  Vitals and nursing note reviewed.   Constitutional:       General: She is not in acute distress.     Appearance: Normal appearance. She is well-developed.   HENT:      Head: Normocephalic and atraumatic.   Eyes:      General: No scleral icterus.        Right eye: No discharge.         Left eye: No discharge.      Conjunctiva/sclera: Conjunctivae normal.   Cardiovascular:      Rate and Rhythm: Normal rate and regular rhythm.      Heart sounds: Normal heart sounds.   Pulmonary:      Effort: Pulmonary effort is normal. No respiratory distress.      Breath sounds: Normal breath sounds. No wheezing.   Musculoskeletal:      Lumbar back: Tenderness present. No bony tenderness. Decreased range of motion. Positive left straight leg raise test. Negative right straight leg raise test.   Skin:     General: Skin is warm and dry.      Coloration: Skin is not jaundiced.   Neurological:      Mental Status: She is alert and oriented to

## 2025-08-08 ENCOUNTER — PATIENT MESSAGE (OUTPATIENT)
Dept: FAMILY MEDICINE CLINIC | Age: 60
End: 2025-08-08

## 2025-08-08 DIAGNOSIS — M51.369 BULGING LUMBAR DISC: ICD-10-CM

## 2025-08-08 DIAGNOSIS — K14.6 BURNING TONGUE SYNDROME: ICD-10-CM

## 2025-08-08 DIAGNOSIS — M54.42 ACUTE BILATERAL LOW BACK PAIN WITH LEFT-SIDED SCIATICA: Primary | ICD-10-CM

## 2025-08-08 RX ORDER — GABAPENTIN 100 MG/1
200 CAPSULE ORAL 3 TIMES DAILY
Qty: 180 CAPSULE | Refills: 2
Start: 2025-08-08 | End: 2026-02-04

## 2025-08-19 DIAGNOSIS — M54.42 ACUTE BILATERAL LOW BACK PAIN WITH LEFT-SIDED SCIATICA: ICD-10-CM

## 2025-08-19 DIAGNOSIS — M51.369 BULGING LUMBAR DISC: ICD-10-CM

## 2025-08-19 RX ORDER — GABAPENTIN 100 MG/1
200 CAPSULE ORAL 3 TIMES DAILY
Qty: 180 CAPSULE | Refills: 2 | OUTPATIENT
Start: 2025-08-19 | End: 2026-02-15

## 2025-08-22 ENCOUNTER — PATIENT MESSAGE (OUTPATIENT)
Dept: FAMILY MEDICINE CLINIC | Age: 60
End: 2025-08-22

## 2025-08-22 DIAGNOSIS — E04.9 GOITER: Primary | ICD-10-CM
